# Patient Record
Sex: FEMALE | Race: WHITE | NOT HISPANIC OR LATINO | Employment: STUDENT | URBAN - METROPOLITAN AREA
[De-identification: names, ages, dates, MRNs, and addresses within clinical notes are randomized per-mention and may not be internally consistent; named-entity substitution may affect disease eponyms.]

---
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
## 2023-08-21 ENCOUNTER — OFFICE VISIT (OUTPATIENT)
Dept: OTOLARYNGOLOGY | Facility: CLINIC | Age: 23
End: 2023-08-21
Payer: COMMERCIAL

## 2023-08-21 VITALS — WEIGHT: 276.69 LBS | BODY MASS INDEX: 43.43 KG/M2 | HEIGHT: 67 IN

## 2023-08-21 DIAGNOSIS — H61.21 IMPACTED CERUMEN OF RIGHT EAR: ICD-10-CM

## 2023-08-21 DIAGNOSIS — R51.9 CHRONIC INTRACTABLE HEADACHE, UNSPECIFIED HEADACHE TYPE: Primary | ICD-10-CM

## 2023-08-21 DIAGNOSIS — G89.29 CHRONIC INTRACTABLE HEADACHE, UNSPECIFIED HEADACHE TYPE: Primary | ICD-10-CM

## 2023-08-21 DIAGNOSIS — H92.01 OTALGIA, RIGHT: ICD-10-CM

## 2023-08-21 PROCEDURE — 99204 PR OFFICE/OUTPT VISIT, NEW, LEVL IV, 45-59 MIN: ICD-10-PCS | Mod: 25,S$GLB,, | Performed by: OTOLARYNGOLOGY

## 2023-08-21 PROCEDURE — 1160F RVW MEDS BY RX/DR IN RCRD: CPT | Mod: CPTII,S$GLB,, | Performed by: OTOLARYNGOLOGY

## 2023-08-21 PROCEDURE — 69210 PR REMOVAL IMPACTED CERUMEN REQUIRING INSTRUMENTATION, UNILATERAL: ICD-10-PCS | Mod: S$GLB,,, | Performed by: OTOLARYNGOLOGY

## 2023-08-21 PROCEDURE — 99999 PR PBB SHADOW E&M-NEW PATIENT-LVL III: CPT | Mod: PBBFAC,,, | Performed by: OTOLARYNGOLOGY

## 2023-08-21 PROCEDURE — 99999 PR PBB SHADOW E&M-NEW PATIENT-LVL III: ICD-10-PCS | Mod: PBBFAC,,, | Performed by: OTOLARYNGOLOGY

## 2023-08-21 PROCEDURE — 1160F PR REVIEW ALL MEDS BY PRESCRIBER/CLIN PHARMACIST DOCUMENTED: ICD-10-PCS | Mod: CPTII,S$GLB,, | Performed by: OTOLARYNGOLOGY

## 2023-08-21 PROCEDURE — 1159F PR MEDICATION LIST DOCUMENTED IN MEDICAL RECORD: ICD-10-PCS | Mod: CPTII,S$GLB,, | Performed by: OTOLARYNGOLOGY

## 2023-08-21 PROCEDURE — 99204 OFFICE O/P NEW MOD 45 MIN: CPT | Mod: 25,S$GLB,, | Performed by: OTOLARYNGOLOGY

## 2023-08-21 PROCEDURE — 69210 REMOVE IMPACTED EAR WAX UNI: CPT | Mod: S$GLB,,, | Performed by: OTOLARYNGOLOGY

## 2023-08-21 PROCEDURE — 3008F BODY MASS INDEX DOCD: CPT | Mod: CPTII,S$GLB,, | Performed by: OTOLARYNGOLOGY

## 2023-08-21 PROCEDURE — 1159F MED LIST DOCD IN RCRD: CPT | Mod: CPTII,S$GLB,, | Performed by: OTOLARYNGOLOGY

## 2023-08-21 PROCEDURE — 3008F PR BODY MASS INDEX (BMI) DOCUMENTED: ICD-10-PCS | Mod: CPTII,S$GLB,, | Performed by: OTOLARYNGOLOGY

## 2023-08-21 RX ORDER — ONDANSETRON 4 MG/1
4 TABLET, ORALLY DISINTEGRATING ORAL EVERY 8 HOURS PRN
COMMUNITY
Start: 2023-08-16 | End: 2023-10-26

## 2023-08-21 RX ORDER — PREDNISONE 20 MG/1
40 TABLET ORAL DAILY
Qty: 10 TABLET | Refills: 0 | Status: SHIPPED | OUTPATIENT
Start: 2023-08-21 | End: 2023-08-26

## 2023-08-21 NOTE — PATIENT INSTRUCTIONS
Oral Steroid    You have been prescribed an oral steroid. Use as directed.    Note: This medication can be very effective in treating inflammation but may be associated with several side effects such as:    Stomach irritation (consider antacid medication while you are on prednisone),  insomnia (please take in the morning to reduce this if dosing is once daily), mood changes, high blood pressure, elevated sugar levels (especially in diabetics), infections, fluid retention, rash, swelling, tendon rupture, and hip fracture.     Please report any liver disease, diabetes, osteoporosis, stomach ulcer disease, glaucoma, history of GI bleeding, Myasthenia Gravis PRIOR to initiating this medication.

## 2023-08-21 NOTE — PROGRESS NOTES
Subjective:       Patient ID: Malinda Brady is a 23 y.o. female.    Chief Complaint: Otitis Media (Pt has been having headaches, nausea, vomit and thinks it is from an ear infection )    Malinda is here for right otalgia.   Length of symptoms: 1 week.  Onset: Acute  Symptoms occur Every day  She endorses severe otalgia, aching in nature. Worse with laying down. Has radiating neck pain and a frontotemporal headache. She has light sensitivity.   Went to  recently. Therapies tried include: Ibuprofen , Tylenol, Zofran       Patient validated questionnaires (if applicable):      %            No data to display                   No data to display                   No data to display                     Social History     Tobacco Use   Smoking Status Never   Smokeless Tobacco Never     Social History     Substance and Sexual Activity   Alcohol Use No          Objective:        Constitutional:   Vital signs are normal. She appears well-developed and well-nourished.     Head:  Normocephalic and atraumatic.     Ears:  Left ear hearing normal to normal and whispered voice; external ear normal without scars, lesions, or masses; ear canal, tympanic membrane, and middle ear normal..   R cerumen impaction    Nose:  Nose normal including turbinates, nasal mucosa, sinuses and nasal septum.     Mouth/Throat  Oropharynx clear and moist without lesions or asymmetry.     Neck:  Neck normal without thyromegaly masses, asymmetry, normal tracheal structure, crepitus, and tenderness.         Tests / Results:  Procedure: bilateral microscopy with removal of cerumen  Reason: cerumen impaction, inability to visualize the tympanic membrane  Details: microscope used to obtain view of ear canals bilaterally cerumen removed with the use curette, suction, and alligator forceps  Findings: AD: thick, dry dense impaction against TM - TM normal after cleaning, AS: no wax  Patient tolerated procedure well.    Assessment:       1. Chronic  intractable headache, unspecified headache type    2. Impacted cerumen of right ear    3. Otalgia, right          Plan:         Otalgia may be 2/2 ceurmen impaction which was removed.  Headache unlikely related - intractable based on length. Short course of steroids discussed.   Recommend ER if HA not improved or other symptoms develop    I discussed the risks of corticosteroid use directly with the patient. This includes increased appetite with weight change, increased blood pressure, decreased sleep, upset stomach (possible ulcers), pancreatitis, glucose/metabolism changes, mood changes/irritability, immunosuppression, acute glaucoma, osteoporosis, and rarely necrosis of femur. Patient understood and would like to proceed with treatment.

## 2023-08-23 ENCOUNTER — TELEPHONE (OUTPATIENT)
Dept: NEUROLOGY | Facility: CLINIC | Age: 23
End: 2023-08-23
Payer: COMMERCIAL

## 2023-08-23 NOTE — TELEPHONE ENCOUNTER
----- Message from Thania Aguilar RN sent at 8/22/2023  2:46 PM CDT -----  Regarding: [facility] ER visit f/u 8/22/2023 Dx: Headache  Please call patient to schedule an appointment for further evaluation and treatment of her headache.       Thanks,    Thania Aguilar RN, BSN  ED Navigator/Case Management  [phone]

## 2023-08-24 ENCOUNTER — TELEPHONE (OUTPATIENT)
Dept: NEUROLOGY | Facility: CLINIC | Age: 23
End: 2023-08-24
Payer: COMMERCIAL

## 2023-08-24 ENCOUNTER — OFFICE VISIT (OUTPATIENT)
Dept: NEUROLOGY | Facility: CLINIC | Age: 23
End: 2023-08-24
Payer: COMMERCIAL

## 2023-08-24 VITALS
WEIGHT: 276.88 LBS | HEART RATE: 71 BPM | RESPIRATION RATE: 17 BRPM | BODY MASS INDEX: 43.46 KG/M2 | HEIGHT: 67 IN | DIASTOLIC BLOOD PRESSURE: 91 MMHG | SYSTOLIC BLOOD PRESSURE: 135 MMHG | TEMPERATURE: 97 F

## 2023-08-24 DIAGNOSIS — G89.29 CHRONIC NONINTRACTABLE HEADACHE, UNSPECIFIED HEADACHE TYPE: ICD-10-CM

## 2023-08-24 DIAGNOSIS — R06.83 SNORING: ICD-10-CM

## 2023-08-24 DIAGNOSIS — R51.9 CHRONIC NONINTRACTABLE HEADACHE, UNSPECIFIED HEADACHE TYPE: ICD-10-CM

## 2023-08-24 DIAGNOSIS — H93.A9 PULSATILE TINNITUS, UNSPECIFIED EAR: ICD-10-CM

## 2023-08-24 DIAGNOSIS — H93.A9 PULSATILE TINNITUS: ICD-10-CM

## 2023-08-24 DIAGNOSIS — G43.009 MIGRAINE WITHOUT AURA AND WITHOUT STATUS MIGRAINOSUS, NOT INTRACTABLE: Primary | ICD-10-CM

## 2023-08-24 DIAGNOSIS — E66.01 MORBID OBESITY WITH BMI OF 40.0-44.9, ADULT: ICD-10-CM

## 2023-08-24 DIAGNOSIS — M79.18 CERVICAL MYOFASCIAL PAIN SYNDROME: ICD-10-CM

## 2023-08-24 DIAGNOSIS — R51.9 WORSENING HEADACHES: ICD-10-CM

## 2023-08-24 DIAGNOSIS — H53.9 VISION CHANGES: ICD-10-CM

## 2023-08-24 DIAGNOSIS — R51.9 NONINTRACTABLE HEADACHE, UNSPECIFIED CHRONICITY PATTERN, UNSPECIFIED HEADACHE TYPE: ICD-10-CM

## 2023-08-24 DIAGNOSIS — R11.0 NAUSEA: ICD-10-CM

## 2023-08-24 PROCEDURE — 1160F RVW MEDS BY RX/DR IN RCRD: CPT | Mod: CPTII,S$GLB,, | Performed by: NURSE PRACTITIONER

## 2023-08-24 PROCEDURE — 99999 PR PBB SHADOW E&M-EST. PATIENT-LVL V: ICD-10-PCS | Mod: PBBFAC,,, | Performed by: NURSE PRACTITIONER

## 2023-08-24 PROCEDURE — 3080F PR MOST RECENT DIASTOLIC BLOOD PRESSURE >= 90 MM HG: ICD-10-PCS | Mod: CPTII,S$GLB,, | Performed by: NURSE PRACTITIONER

## 2023-08-24 PROCEDURE — 3080F DIAST BP >= 90 MM HG: CPT | Mod: CPTII,S$GLB,, | Performed by: NURSE PRACTITIONER

## 2023-08-24 PROCEDURE — 3008F PR BODY MASS INDEX (BMI) DOCUMENTED: ICD-10-PCS | Mod: CPTII,S$GLB,, | Performed by: NURSE PRACTITIONER

## 2023-08-24 PROCEDURE — 96372 THER/PROPH/DIAG INJ SC/IM: CPT | Mod: S$GLB,,, | Performed by: NURSE PRACTITIONER

## 2023-08-24 PROCEDURE — 3075F SYST BP GE 130 - 139MM HG: CPT | Mod: CPTII,S$GLB,, | Performed by: NURSE PRACTITIONER

## 2023-08-24 PROCEDURE — 96372 PR INJECTION,THERAP/PROPH/DIAG2ST, IM OR SUBCUT: ICD-10-PCS | Mod: S$GLB,,, | Performed by: NURSE PRACTITIONER

## 2023-08-24 PROCEDURE — 3075F PR MOST RECENT SYSTOLIC BLOOD PRESS GE 130-139MM HG: ICD-10-PCS | Mod: CPTII,S$GLB,, | Performed by: NURSE PRACTITIONER

## 2023-08-24 PROCEDURE — 1159F PR MEDICATION LIST DOCUMENTED IN MEDICAL RECORD: ICD-10-PCS | Mod: CPTII,S$GLB,, | Performed by: NURSE PRACTITIONER

## 2023-08-24 PROCEDURE — 99205 PR OFFICE/OUTPT VISIT, NEW, LEVL V, 60-74 MIN: ICD-10-PCS | Mod: 25,S$GLB,, | Performed by: NURSE PRACTITIONER

## 2023-08-24 PROCEDURE — 1159F MED LIST DOCD IN RCRD: CPT | Mod: CPTII,S$GLB,, | Performed by: NURSE PRACTITIONER

## 2023-08-24 PROCEDURE — 3008F BODY MASS INDEX DOCD: CPT | Mod: CPTII,S$GLB,, | Performed by: NURSE PRACTITIONER

## 2023-08-24 PROCEDURE — 99999 PR PBB SHADOW E&M-EST. PATIENT-LVL V: CPT | Mod: PBBFAC,,, | Performed by: NURSE PRACTITIONER

## 2023-08-24 PROCEDURE — 99205 OFFICE O/P NEW HI 60 MIN: CPT | Mod: 25,S$GLB,, | Performed by: NURSE PRACTITIONER

## 2023-08-24 PROCEDURE — 1160F PR REVIEW ALL MEDS BY PRESCRIBER/CLIN PHARMACIST DOCUMENTED: ICD-10-PCS | Mod: CPTII,S$GLB,, | Performed by: NURSE PRACTITIONER

## 2023-08-24 RX ORDER — PROCHLORPERAZINE MALEATE 10 MG
10 TABLET ORAL EVERY 6 HOURS PRN
Qty: 60 TABLET | Refills: 11 | Status: SHIPPED | OUTPATIENT
Start: 2023-08-24 | End: 2023-10-26

## 2023-08-24 RX ORDER — KETOROLAC TROMETHAMINE 30 MG/ML
30 INJECTION, SOLUTION INTRAMUSCULAR; INTRAVENOUS
Status: COMPLETED | OUTPATIENT
Start: 2023-08-24 | End: 2023-08-24

## 2023-08-24 RX ORDER — TIZANIDINE 4 MG/1
TABLET ORAL
Qty: 30 TABLET | Refills: 11 | Status: SHIPPED | OUTPATIENT
Start: 2023-08-24

## 2023-08-24 RX ORDER — FROVATRIPTAN SUCCINATE 2.5 MG/1
TABLET, FILM COATED ORAL
Qty: 10 TABLET | Refills: 11 | Status: SHIPPED | OUTPATIENT
Start: 2023-08-24 | End: 2023-09-22 | Stop reason: ALTCHOICE

## 2023-08-24 RX ADMIN — KETOROLAC TROMETHAMINE 30 MG: 30 INJECTION, SOLUTION INTRAMUSCULAR; INTRAVENOUS at 01:08

## 2023-08-24 NOTE — PROGRESS NOTES
Date of service: 8/24/2023  Referring provider: Vidya Coronel    Subjective:      Chief complaint: Headache       Patient ID: Malinda Brady is a 23 y.o. female who presents for new patient evaluation of headache     History of Present Illness    ORIGINAL HEADACHE HISTORY - 8/24/23  Age at onset and course over time: 8/14/23 began with migraine that has not broken. Went to ER. Currently on steroids (started yesterday).  Began with migraine headaches around age 16 with menstrual cycle. Treated with OTC.    Family  history of migraines - sister, mom  Family history of aneurysm - none  Last eye exam - over 10 years ago    No recent high dose vitamin A or tetracycline use. Steady weight, no changes for some time. She has had some vision changes in the past month. She has poor vision more so when tired. Left eye blurry.     Location: varies   Quality:  [x] pressure [] tight [x] throbbing [] sharp [] stabbing   Severity: current 6 with range 3-8  Duration: days  Frequency: daily since 8/14  Headaches awaken at night?:  yes  Worst time of day: upon waking   Associated with: [x] photophobia [x]  phonophobia [] osmophobia [] blurred vision  [] double vision [x] loss of appetite [x] nausea [x] vomiting [x] dizziness [] vertigo  [x] tinnitus [] irritability [] sinus pressure [] problems with concentration   [x] neck tightness   Alleviated by:  [x] sleep [x] darkness [] massage [] heat [x] ice [x] medication  Exacerbated by:  [x] fatigue [x] light [x] noise [] smells [] coughing [] sneezing  [x] bending over [x] ovulation [] menses [] alcohol [] change in weather [x]  stress  Ipsilateral autonomic: [] nasal congestion [] lacrimation [] ptosis [] injection [] edema [] foreign body sensation [] ear fullness   ICP:  [] transient visual obscurations  [x] tinnitus pulsatile  [] positional headache  [x] non-positional   Sleep habits: trouble staying asleep, unrefreshing sleep, snoring   Caffeine intake: 16 oz  Gyn status (if  female): having periods, uses condoms    HIT 6: 64    Current acute treatment:  Zofran  Ibuprofen - 5 days per week    Current prevention:  None    Previously tried/failed acute treatment:  Ibuprofen  Tylenol  Excedrin    Previously tried/failed preventative treatment:  None     Review of patient's allergies indicates:  No Known Allergies  Current Outpatient Medications   Medication Sig Dispense Refill    ondansetron (ZOFRAN-ODT) 4 MG TbDL Take 4 mg by mouth every 8 (eight) hours as needed.      predniSONE (DELTASONE) 20 MG tablet Take 2 tablets (40 mg total) by mouth once daily. Take in am, take with food for 5 days 10 tablet 0    frovatriptan (FROVA) 2.5 MG tablet 1 tab PO PRN migraine. May repeat once in 2 hours, no more than 2 tab in 24 hours. Use no more than 10 days per month. 10 tablet 11    prochlorperazine (COMPAZINE) 10 MG tablet Take 1 tablet (10 mg total) by mouth every 6 (six) hours as needed (migraine or nausea). 60 tablet 11    tiZANidine (ZANAFLEX) 4 MG tablet Half or full tablet by mouth at night as needed for muscle spasm 30 tablet 11     No current facility-administered medications for this visit.       Past Medical History  Past Medical History:   Diagnosis Date    Acne     Menarche     Age of onset 10       Past Surgical History  Past Surgical History:   Procedure Laterality Date    TONSILLECTOMY      Tonsils         Family History  Family History   Problem Relation Age of Onset    Acne Brother     Breast cancer Paternal Grandmother     Migraines Father     Arthritis Mother     Migraines Sister     No Known Problems Sister     Colon cancer Neg Hx     Diabetes Neg Hx     Hypertension Neg Hx     Ovarian cancer Neg Hx     Melanoma Neg Hx     Lupus Neg Hx     Psoriasis Neg Hx     Eczema Neg Hx     Cancer Neg Hx        Social History  Social History     Socioeconomic History    Marital status: Single   Occupational History    Occupation: Student   Tobacco Use    Smoking status: Never    Smokeless  tobacco: Never   Substance and Sexual Activity    Alcohol use: No    Drug use: No    Sexual activity: Yes     Birth control/protection: Condom   Other Topics Concern    Are you pregnant or think you may be? No    Breast-feeding No   Social History Narrative    Lives in a dorm room at school and comes home to both parents during her breaks    2 dog     3 siblings    1st year at [employer]        Review of Systems  14-point review of systems as follows:   No check arelis indicates NEGATIVE response   Constitutional: [x] weight loss, [x] change to appetite   Eyes: [x] change in vision, [] double vision   Ears, nose, mouth, throat: [] frequent nose bleeds, [x] ringing in the ears   Respiratory: [] cough, [] wheezing   Cardiovascular: [] chest pain, [] palpitations   Gastrointestinal: [] jaundice, [] nausea/vomiting   Genitourinary: [] incontinence, [] burning with urination   Hematologic/lymphatic: [] easy bruising/bleeding, [x] night sweats   Neurological: [] numbness, [x] weakness   Endocrine: [x] fatigue, [] heat/cold intolerance   Allergy/Immunologic: [] fevers, [] chills   Musculoskeletal: [] muscle pain, [] joint pain   Psychiatric: [] thoughts of harming self/others, [] depression   Integumentary: [] rashes, [] sores that do not heal     Objective:        Vitals:    08/24/23 1308   BP: (!) 135/91   Pulse: 71   Resp: 17   Temp: 97.2 °F (36.2 °C)     Body mass index is 43.37 kg/m².    8/24/23  Constitutional: appears in no acute distress, well-developed, well-nourished     Eyes: normal conjunctiva, PERRLA    Ears, nose, mouth, throat: external appearance of ears and nose normal, hearing intact     Cardiovascular: regular rate and rhythm, no murmurs appreciated    Respiratory: unlabored respirations, breath sounds normal bilaterally    Gastrointestinal: no visible abdominal masses, no guarding, no visible hernia    Musculoskeletal: normal tone in all four extremities. No abnormal movements. No pronator drift. No  orbit. Symmetric finger tapping. Normal station. Normal regular gait. Normal tandem gait.      Spine:   CERVICAL SPINE:  ROM: mildly restricted    MUSCLE SPASM: no   FACET LOADING: no   SPURLING: no  JORDAN / PAMELA tender: no     Psychiatric: normal judgment and insight. Oriented to person, place, and time.     Neurologic:   Cortical functions: recent and remote memory intact, normal attention span and concentration, speech fluent, adequate fund of knowledge   Cranial nerves: visual fields full, PERRLA, EOMI, symmetric facial strength, hearing intact, palate elevates symmetrically, shoulder shrug 5/5, tongue protrudes midline   Reflexes: 2+ in the upper and lower extremities, no Lyons  Sensation: intact to temperature throughout   Coordination: normal finger to nose, heel to shin    Data Review:     I have personally reviewed the referring provider's notes, labs, & imaging made available to me today.      RADIOLOGY STUDIES:  I have personally reviewed the pertinent images performed.       Results for orders placed or performed during the hospital encounter of 08/22/23   CT Head Without Contrast    Narrative    EXAMINATION:  CT HEAD WITHOUT CONTRAST    CLINICAL HISTORY:  Headache, chronic, new features or increased frequency;    TECHNIQUE:  Low dose axial images were obtained through the head.  Coronal and sagittal reformations were also performed. Contrast was not administered.    Automated exposure control radiation dose lowering technique was utilized.  The DLP is 549.    COMPARISON:  None.    FINDINGS:  Brain is normally formed.  There is no hydrocephalus or any abnormal extra-axial fluid collections.  No acute intracranial hemorrhages.  Within the supratentorial cerebral hemispheres the gray/white matter delineation is preserved.  No abnormal mass effects or midline shift or herniations.  There is no parenchymal hemorrhages.  In the posterior fossa the 4th ventricle is in midline.  No cerebellar hemorrhages or  "signs for cerebellar infarctions.    There is a borderline enlargement of the sella turcica with a partially empty sella.  This finding can sometimes be seen in patients with chronic intracranial hypertension.    Visualized ocular globes demonstrate no gross abnormalities.  Visualized paranasal air sinuses are clear.  Normal pneumatization of the mastoids.  Cranial vault fractures or osteoblastic or lytic lesions.      Impression    1. No acute intracranial processes.  2. Borderline enlargement of the sella turcica with a partially empty sella as above.      Electronically signed by: Nick Bee MD  Date:    08/22/2023  Time:    11:59       Lab Results   Component Value Date    AST 15 07/20/2015    ALT 22 07/20/2015    ALBUMIN 3.5 07/20/2015    PROT 7.1 07/20/2015    BILITOT 0.3 07/20/2015    CHOL 166 07/20/2015    HDL 30 (L) 07/20/2015    LDLCALC 99.8 07/20/2015    TRIG 181 (H) 07/20/2015       No results found for: "WBC", "HGB", "HCT", "MCV", "PLT"    No results found for: "TSH"        Assessment & Plan:       Problem List Items Addressed This Visit          Neuro    Migraine without aura and without status migrainosus, not intractable - Primary    Overview     Menstrual migraines since around age 16. Headaches are typically unilateral, moderate to severe in intensity, worsen with activity, pounding in quality and associated with sensitivity to light and sound.     Given sudden worsening with vision changes, recommend imaging. Need to evaluate for pseudotumor cerebri.    Her typical frequency is once per month. Start magnesium. For acute attacks will start with frovatriptan. Its long half life is ideal for menstrual migraines.          Relevant Medications    ketorolac injection 30 mg (Completed)    frovatriptan (FROVA) 2.5 MG tablet       Endocrine    Morbid obesity with BMI of 40.0-44.9, adult    Current Assessment & Plan     Discussed 8% weight loss can start to improve symptoms and 20% curative for PCT.       "    Relevant Orders    Ambulatory referral/consult to Sleep Disorders     Other Visit Diagnoses       Nonintractable headache, unspecified chronicity pattern, unspecified headache type        Chronic nonintractable headache, unspecified headache type        Relevant Orders    MRV Brain Without Contrast    Pulsatile tinnitus, unspecified ear        Relevant Orders    MRV Brain Without Contrast    Cervical myofascial pain syndrome        Relevant Medications    tiZANidine (ZANAFLEX) 4 MG tablet    Worsening headaches        Relevant Orders    MRI Brain W WO Contrast    MRV Brain Without Contrast    Pulsatile tinnitus        Relevant Orders    MRV Brain Without Contrast    Vision changes        Relevant Orders    Ambulatory referral/consult to Ophthalmology    Snoring        Relevant Orders    Ambulatory referral/consult to Sleep Disorders    Nausea        Relevant Medications    prochlorperazine (COMPAZINE) 10 MG tablet                Please call our clinic at [phone] or send a message on the [employer] portal if there are any changes to the plan described below, for example,if you are not contacted for the requested tests, referral(s) within one week, if you are unable to receive the medications prescribed, or if you feel you need to change the treatment course for any reason.     TESTING:  -- MRI, MRV  -- sleep study    REFERRALS:  -- neuro-ophthalmology  -- sleep specialist    PREVENTION (use daily regardless of headache):  -- start magnesium in ONE of the following preparations -               1. Magnesium oxide 800mg daily (the most common over the counter kind, may causes loose stools)              2. Magnesium citrate 400-500mg daily (harder to find, but more neutral on the bowels)              3. Magnesium glycinate 400mg daily (hardest to find, look online, but most bowel-neutral, best absorbed)   -- if you have papilledema, we will start Diamox    AS-NEEDED TREATMENT (use total no more than 10 days per  month unless otherwise stated):  -- START frovatriptan. You can repeat two hours later if needed  -- START tizanidine at night. This is a muscle relaxer and it will also make you potentially sleepy. Start with half a tablet to see how you respond but can take up to a whole tablet if needed  -- start compazine. This is a nausea medication that also has anti-migraine properties. Can take daily and will not contribute to rebound headaches      Follow up in about 6 weeks (around 10/5/2023).    Kika Najera, NP

## 2023-08-24 NOTE — PATIENT INSTRUCTIONS
Please call our clinic at [phone] or send a message on the [employer] portal if there are any changes to the plan described below, for example,if you are not contacted for the requested tests, referral(s) within one week, if you are unable to receive the medications prescribed, or if you feel you need to change the treatment course for any reason.     TESTING:  -- MRI, MRV  -- sleep study    REFERRALS:  -- neuro-ophthalmology  -- sleep specialist    PREVENTION (use daily regardless of headache):  -- start magnesium in ONE of the following preparations -               1. Magnesium oxide 800mg daily (the most common over the counter kind, may causes loose stools)              2. Magnesium citrate 400-500mg daily (harder to find, but more neutral on the bowels)              3. Magnesium glycinate 400mg daily (hardest to find, look online, but most bowel-neutral, best absorbed)   -- if you have papilledema, we will start Diamox    AS-NEEDED TREATMENT (use total no more than 10 days per month unless otherwise stated):  -- START frovatriptan. You can repeat two hours later if needed  -- START tizanidine at night. This is a muscle relaxer and it will also make you potentially sleepy. Start with half a tablet to see how you respond but can take up to a whole tablet if needed  -- start compazine. This is a nausea medication that also has anti-migraine properties. Can take daily and will not contribute to rebound headaches

## 2023-08-28 ENCOUNTER — PATIENT MESSAGE (OUTPATIENT)
Dept: NEUROLOGY | Facility: CLINIC | Age: 23
End: 2023-08-28
Payer: COMMERCIAL

## 2023-09-06 ENCOUNTER — DOCUMENTATION ONLY (OUTPATIENT)
Dept: NEUROLOGY | Facility: CLINIC | Age: 23
End: 2023-09-06
Payer: COMMERCIAL

## 2023-09-06 ENCOUNTER — TELEPHONE (OUTPATIENT)
Dept: NEUROLOGY | Facility: CLINIC | Age: 23
End: 2023-09-06
Payer: COMMERCIAL

## 2023-09-06 DIAGNOSIS — G93.2 IIH (IDIOPATHIC INTRACRANIAL HYPERTENSION): Primary | ICD-10-CM

## 2023-09-06 NOTE — TELEPHONE ENCOUNTER
----- Message from Elysia Peres sent at 9/6/2023  2:33 PM CDT -----  Dropped off a disk for imaging

## 2023-09-06 NOTE — PROGRESS NOTES
"Received imaging report from [facility]  MRI with and without contrast: "there may be some flattening of the pituitary within the sella, suggestive of possibility of increased intracranial pressure"    MRA: "suspect bilateral transverse sinus stenoses, left greater than right"    "

## 2023-09-11 ENCOUNTER — TELEPHONE (OUTPATIENT)
Dept: OPHTHALMOLOGY | Facility: CLINIC | Age: 23
End: 2023-09-11
Payer: COMMERCIAL

## 2023-09-11 NOTE — TELEPHONE ENCOUNTER
Spoke to pt's mother in regards to the referral and informed her that it would be best to start off with an optometrist to triage her daughter's condition. Will forward pt's information to optom.      ----- Message from Yohana Cheney sent at 9/11/2023 11:27 AM CDT -----  Regarding: FW: advice  Contact: CHULA AGGIE S [[id number]]    ----- Message -----  From: Eagle Prasad  Sent: 9/11/2023  11:25 AM CDT  To: Romeo SANCHE[facility] Staff  Subject: advice                                           Type:  Sooner Appointment Request    Caller is requesting a sooner appointment.      Name of Caller:  kinza Duff    When is the first available appointment?  Dept book    Symptoms:  Referral in system     Best Call Back Number:  [phone]    Additional Information: Mom has been told it's possible Papilledema or pseudotumor by ED but they weren't sure, wants to start with Dr. Walters and get a definitive DX. Pt has had MRI with [facility] in [place].  Pt has limited visual acuity like tunnel vision. Please call to advise.

## 2023-09-12 ENCOUNTER — TELEPHONE (OUTPATIENT)
Dept: NEUROLOGY | Facility: CLINIC | Age: 23
End: 2023-09-12
Payer: COMMERCIAL

## 2023-09-12 ENCOUNTER — TELEPHONE (OUTPATIENT)
Dept: OPTOMETRY | Facility: CLINIC | Age: 23
End: 2023-09-12
Payer: COMMERCIAL

## 2023-09-12 ENCOUNTER — TELEPHONE (OUTPATIENT)
Dept: OPHTHALMOLOGY | Facility: CLINIC | Age: 23
End: 2023-09-12
Payer: COMMERCIAL

## 2023-09-12 ENCOUNTER — OFFICE VISIT (OUTPATIENT)
Dept: FAMILY MEDICINE | Facility: CLINIC | Age: 23
End: 2023-09-12
Payer: COMMERCIAL

## 2023-09-12 VITALS
OXYGEN SATURATION: 99 % | SYSTOLIC BLOOD PRESSURE: 142 MMHG | WEIGHT: 293 LBS | HEART RATE: 63 BPM | BODY MASS INDEX: 45.99 KG/M2 | HEIGHT: 67 IN | DIASTOLIC BLOOD PRESSURE: 98 MMHG

## 2023-09-12 DIAGNOSIS — H53.9 VISUAL DISTURBANCE: Primary | ICD-10-CM

## 2023-09-12 DIAGNOSIS — G43.009 MIGRAINE WITHOUT AURA AND WITHOUT STATUS MIGRAINOSUS, NOT INTRACTABLE: Chronic | ICD-10-CM

## 2023-09-12 DIAGNOSIS — E66.01 MORBID OBESITY WITH BMI OF 40.0-44.9, ADULT: Chronic | ICD-10-CM

## 2023-09-12 DIAGNOSIS — R03.0 ELEVATED BLOOD PRESSURE READING IN OFFICE WITHOUT DIAGNOSIS OF HYPERTENSION: ICD-10-CM

## 2023-09-12 PROCEDURE — 99204 OFFICE O/P NEW MOD 45 MIN: CPT | Mod: S$GLB,,, | Performed by: FAMILY MEDICINE

## 2023-09-12 PROCEDURE — 99999 PR PBB SHADOW E&M-EST. PATIENT-LVL III: CPT | Mod: PBBFAC,,, | Performed by: FAMILY MEDICINE

## 2023-09-12 PROCEDURE — 1159F PR MEDICATION LIST DOCUMENTED IN MEDICAL RECORD: ICD-10-PCS | Mod: CPTII,S$GLB,, | Performed by: FAMILY MEDICINE

## 2023-09-12 PROCEDURE — 3080F PR MOST RECENT DIASTOLIC BLOOD PRESSURE >= 90 MM HG: ICD-10-PCS | Mod: CPTII,S$GLB,, | Performed by: FAMILY MEDICINE

## 2023-09-12 PROCEDURE — 3077F SYST BP >= 140 MM HG: CPT | Mod: CPTII,S$GLB,, | Performed by: FAMILY MEDICINE

## 2023-09-12 PROCEDURE — 1160F RVW MEDS BY RX/DR IN RCRD: CPT | Mod: CPTII,S$GLB,, | Performed by: FAMILY MEDICINE

## 2023-09-12 PROCEDURE — 3077F PR MOST RECENT SYSTOLIC BLOOD PRESSURE >= 140 MM HG: ICD-10-PCS | Mod: CPTII,S$GLB,, | Performed by: FAMILY MEDICINE

## 2023-09-12 PROCEDURE — 3008F PR BODY MASS INDEX (BMI) DOCUMENTED: ICD-10-PCS | Mod: CPTII,S$GLB,, | Performed by: FAMILY MEDICINE

## 2023-09-12 PROCEDURE — 1159F MED LIST DOCD IN RCRD: CPT | Mod: CPTII,S$GLB,, | Performed by: FAMILY MEDICINE

## 2023-09-12 PROCEDURE — 99999 PR PBB SHADOW E&M-EST. PATIENT-LVL III: ICD-10-PCS | Mod: PBBFAC,,, | Performed by: FAMILY MEDICINE

## 2023-09-12 PROCEDURE — 3008F BODY MASS INDEX DOCD: CPT | Mod: CPTII,S$GLB,, | Performed by: FAMILY MEDICINE

## 2023-09-12 PROCEDURE — 3080F DIAST BP >= 90 MM HG: CPT | Mod: CPTII,S$GLB,, | Performed by: FAMILY MEDICINE

## 2023-09-12 PROCEDURE — 1160F PR REVIEW ALL MEDS BY PRESCRIBER/CLIN PHARMACIST DOCUMENTED: ICD-10-PCS | Mod: CPTII,S$GLB,, | Performed by: FAMILY MEDICINE

## 2023-09-12 PROCEDURE — 99204 PR OFFICE/OUTPT VISIT, NEW, LEVL IV, 45-59 MIN: ICD-10-PCS | Mod: S$GLB,,, | Performed by: FAMILY MEDICINE

## 2023-09-12 NOTE — PROGRESS NOTES
"PATIENT VISIT FAMILY MEDICINE    CC:   Chief Complaint   Patient presents with    Follow-up     Paperwork        HPI: Malinda Brady  is a 23 y.o. female:    Patient is new to me.  Patient presents alone for routine follow up on chronic conditions.    She lives in [place]. Has been having difficulty getting appts. Hx of chronic migraines. Suddenly worsened in 8/2023. She went to ED 8/22, CT head did not show acute findings. She saw Neurology 8/24, had MRI completed at [facility], results pending.     Reports her peripheral vision is "gone" and distant vision is significantly blurry. This symptoms are new since last month.    No history of hypertension. Does not have a home BP cuff.     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 She is unable to drive given her visual symptoms and finds she is unable to complete computer work due to this as well.     Her last day of work was 8/14. She has not been able to return due to the above.         PMHX:   Past Medical History:   Diagnosis Date    Acne     Menarche     Age of onset 10       PSHX:   Past Surgical History:   Procedure Laterality Date    TONSILLECTOMY      Tonsils         FAMHX:   Family History   Problem Relation Age of Onset    Acne Brother     Breast cancer Paternal Grandmother     Migraines Father     Arthritis Mother     Migraines Sister     No Known Problems Sister     Colon cancer Neg Hx     Diabetes Neg Hx     Hypertension Neg Hx     Ovarian cancer Neg Hx     Melanoma Neg Hx     Lupus Neg Hx     Psoriasis Neg Hx     Eczema Neg Hx     Cancer Neg Hx        SOCHX:   Social History     Socioeconomic History    Marital status: Single   Occupational History    Occupation: Student   Tobacco Use    Smoking status: Never    Smokeless tobacco: Never   Substance and Sexual Activity    Alcohol use: No    Drug use: No    Sexual activity: Yes     Birth control/protection: Condom   Other Topics Concern    Are you pregnant or think you may be? No    Breast-feeding No " "  Social History Narrative    Lives in a dorm room at school and comes home to both parents during her breaks    2 dog     3 siblings    1st year at [employer]       ALLERGIES: Review of patient's allergies indicates:  No Known Allergies    MEDS:   Current Outpatient Medications:     frovatriptan (FROVA) 2.5 MG tablet, 1 tab PO PRN migraine. May repeat once in 2 hours, no more than 2 tab in 24 hours. Use no more than 10 days per month., Disp: 10 tablet, Rfl: 11    ondansetron (ZOFRAN-ODT) 4 MG TbDL, Take 4 mg by mouth every 8 (eight) hours as needed., Disp: , Rfl:     prochlorperazine (COMPAZINE) 10 MG tablet, Take 1 tablet (10 mg total) by mouth every 6 (six) hours as needed (migraine or nausea)., Disp: 60 tablet, Rfl: 11    tiZANidine (ZANAFLEX) 4 MG tablet, Half or full tablet by mouth at night as needed for muscle spasm, Disp: 30 tablet, Rfl: 11    OBJECTIVE:   Vitals:    09/12/23 1508   BP: (!) 142/98   BP Location: Left arm   Patient Position: Sitting   BP Method: Large (Manual)   Pulse: 63   SpO2: 99%   Weight: (!) 136.4 kg (300 lb 11.3 oz)   Height: 5' 7" (1.702 m)     Body mass index is 47.1 kg/m².      Physical Exam  Vitals and nursing note reviewed.   HENT:      Head: Normocephalic.   Eyes:      General:         Right eye: No discharge.         Left eye: No discharge.      Extraocular Movements: Extraocular movements intact.      Conjunctiva/sclera: Conjunctivae normal.      Pupils: Pupils are equal, round, and reactive to light.   Cardiovascular:      Rate and Rhythm: Normal rate and regular rhythm.      Heart sounds: Normal heart sounds.   Pulmonary:      Effort: Pulmonary effort is normal.      Breath sounds: Normal breath sounds.   Musculoskeletal:      Cervical back: Neck supple.      Right lower leg: No edema.      Left lower leg: No edema.   Lymphadenopathy:      Cervical: No cervical adenopathy.   Skin:     Comments: No obvious rash on exposed skin   Neurological:      Mental Status: She is " alert.   Psychiatric:         Behavior: Behavior normal.     ASSESSMENT & PLAN:    Problem List Items Addressed This Visit          Neuro    Migraine without aura and without status migrainosus, not intractable (Chronic)    Overview     Followed by Neurology - has upcoming follow up this month     Menstrual migraines since around age 16. Headaches are typically unilateral, moderate to severe in intensity, worsen with activity, pounding in quality and associated with sensitivity to light and sound.     Given sudden worsening with vision changes, recommend imaging. Need to evaluate for pseudotumor cerebri.    Her typical frequency is once per month. Start magnesium. For acute attacks will start with frovatriptan. Its long half life is ideal for menstrual migraines.             Ophtho    Visual disturbance - Primary (Chronic) she is waiting to get in with Neuro Ophthalmology. Message sent to Ophthalmology in [place] to see if she can be seen sooner. Reports significant deficit in far sighted vision and peripheral vision.     [facility] paperwork filled out and will be sent with relevant treatment notes.        Cardiac/Vascular    Elevated blood pressure reading in office without diagnosis of hypertension (Chronic)    Overview     Recommend home BP log. Patient can message me in 1-2 weeks with home readings and we can consider started medication if needed. Also advised to establish with PCP in [place] in 1 month for continued care.             Endocrine    Morbid obesity with BMI of 40.0-44.9, adult (Chronic)    Overview     Stable. The patient is asked to make an attempt to improve diet and exercise patterns to aid in medical management of this problem.              Needs to establish with PCP where she lives in 1 month for BP f/u and labs.       RTC/ED precautions discussed where applicable.   Encouraged patient to let me know if there are any further questions/concerns.     Advise patient/caretaker to check with  insurance regarding orders to avoid unexpected fees/costs.     The patient/caretaker indicates understanding of these issues and agrees with the plan.    Dr. Lissett Watt MD  Family Medicine

## 2023-09-12 NOTE — TELEPHONE ENCOUNTER
----- Message from Ada Dietz MA sent at 9/11/2023  4:46 PM CDT -----  Regarding: FW: advice  Contact: CHULA AGGIE CEE [[id number]]  Can this pt see Dr. Barth or Dr. Ramirez?  ----- Message -----  From: Yohana Cheney  Sent: 9/11/2023  11:27 AM CDT  To: Ada Dietz MA; Juani Sprague  Subject: FW: advice                                         ----- Message -----  From: Eagle Prasad  Sent: 9/11/2023  11:25 AM CDT  To: Romeo Hayden  Subject: advice                                           Type:  Sooner Appointment Request    Caller is requesting a sooner appointment.      Name of Caller:  kinza Duff    When is the first available appointment?  Dept book    Symptoms:  Referral in system     Best Call Back Number:  [phone]    Additional Information: Mom has been told it's possible Papilledema or pseudotumor by ED but they weren't sure, wants to start with Dr. Walters and get a definitive DX. Pt has had MRI with [facility] in [place].  Pt has limited visual acuity like tunnel vision. Please call to advise.

## 2023-09-12 NOTE — TELEPHONE ENCOUNTER
Called patient's mom and she requested that referrals be sent to DR Noriega and DR Kelsey. Informed that would be completed for the patient. Routed referral to DR Kelsey's staff via [email or web address] and also faxed referral to DR Noriega at [phone].

## 2023-09-12 NOTE — TELEPHONE ENCOUNTER
----- Message from Allyson Glass sent at 9/12/2023 11:01 AM CDT -----  Regarding: Needs Medical Referral  Contact: patient's mom at [phone]  Type: Needs Medical Referral   Who Called:  patient's mom at [phone]    Additional Information: Calling to get an order for a referral for a neuroophthalmologist in [facility]. Please call and advise. Thank you

## 2023-09-12 NOTE — PATIENT INSTRUCTIONS
Keep a blood pressure log over the next 4 weeks and follow up in 4 weeks  -check it around the same time each day   -make sure you are resting for 5 minutes prior to checking   -be seated with your legs uncrossed   -I prefer an automatic upper arm cuff instead of a wrist cuff because it tends to be more accurate    Your goal blood pressure is less than 140/90

## 2023-09-12 NOTE — TELEPHONE ENCOUNTER
----- Message from Johanne Shah sent at 9/12/2023 10:54 AM CDT -----  Regarding: FW: advice  Contact: AGGIE QUIROS [[id number]]  Hey April, I had pt scheduled with Dr. Barth for tomorrow, but she needs fmla paperwork filled out for possible pseudotumor. Dr. Barth said that her neurologist, Dr. Najera wants her to see Neuro Ophthalmology. I talked to mom, and let her know I was sending message to Dr. Kelsey staff to get scheduled. Dr. Najera is also going to put in referral for Dr. Noriega(?) doctor outside of [facility]. Thanks!!  ----- Message -----  From: Ada Dietz MA  Sent: 9/11/2023   4:53 PM CDT  To: [facility] Clinical Support  Subject: FW: advice                                       Can this pt see Dr. Barth or Dr. Ramirez?  ----- Message -----  From: Yohana Cheney  Sent: 9/11/2023  11:27 AM CDT  To: Ada Dietz MA; Juani Sprague  Subject: FW: advice                                         ----- Message -----  From: Eagle Prasad  Sent: 9/11/2023  11:25 AM CDT  To: [facility] Staff  Subject: advice                                           Type:  Sooner Appointment Request    Caller is requesting a sooner appointment.      Name of Caller:  kinza Duff    When is the first available appointment?  Dept book    Symptoms:  Referral in system     Best Call Back Number:  [phone]    Additional Information: Mom has been told it's possible Papilledema or pseudotumor by ED but they weren't sure, wants to start with Dr. Walters and get a definitive DX. Pt has had MRI with [facility] in [place].  Pt has limited visual acuity like tunnel vision. Please call to advise.

## 2023-09-13 ENCOUNTER — PATIENT MESSAGE (OUTPATIENT)
Dept: FAMILY MEDICINE | Facility: CLINIC | Age: 23
End: 2023-09-13
Payer: COMMERCIAL

## 2023-09-22 ENCOUNTER — TELEPHONE (OUTPATIENT)
Dept: NEUROLOGY | Facility: CLINIC | Age: 23
End: 2023-09-22

## 2023-09-22 ENCOUNTER — LAB VISIT (OUTPATIENT)
Dept: LAB | Facility: HOSPITAL | Age: 23
End: 2023-09-22
Attending: NURSE PRACTITIONER
Payer: COMMERCIAL

## 2023-09-22 ENCOUNTER — OFFICE VISIT (OUTPATIENT)
Dept: NEUROLOGY | Facility: CLINIC | Age: 23
End: 2023-09-22
Payer: COMMERCIAL

## 2023-09-22 VITALS
SYSTOLIC BLOOD PRESSURE: 144 MMHG | TEMPERATURE: 98 F | BODY MASS INDEX: 43.49 KG/M2 | RESPIRATION RATE: 17 BRPM | HEIGHT: 67 IN | HEART RATE: 65 BPM | WEIGHT: 277.13 LBS | DIASTOLIC BLOOD PRESSURE: 91 MMHG

## 2023-09-22 DIAGNOSIS — G93.2 IIH (IDIOPATHIC INTRACRANIAL HYPERTENSION): ICD-10-CM

## 2023-09-22 DIAGNOSIS — G43.009 MIGRAINE WITHOUT AURA AND WITHOUT STATUS MIGRAINOSUS, NOT INTRACTABLE: Primary | ICD-10-CM

## 2023-09-22 DIAGNOSIS — R90.89 ABNORMAL MRA, BRAIN: ICD-10-CM

## 2023-09-22 DIAGNOSIS — H53.9 VISION CHANGES: ICD-10-CM

## 2023-09-22 DIAGNOSIS — H53.9 VISUAL DISTURBANCE: Chronic | ICD-10-CM

## 2023-09-22 DIAGNOSIS — Z13.29 THYROID DISORDER SCREEN: ICD-10-CM

## 2023-09-22 LAB
ALBUMIN SERPL BCP-MCNC: 4.3 G/DL (ref 3.5–5.2)
ALP SERPL-CCNC: 62 U/L (ref 55–135)
ALT SERPL W/O P-5'-P-CCNC: 41 U/L (ref 10–44)
ANION GAP SERPL CALC-SCNC: 7 MMOL/L (ref 8–16)
AST SERPL-CCNC: 20 U/L (ref 10–40)
BILIRUB SERPL-MCNC: 0.5 MG/DL (ref 0.1–1)
BUN SERPL-MCNC: 8 MG/DL (ref 6–20)
CALCIUM SERPL-MCNC: 9.4 MG/DL (ref 8.7–10.5)
CHLORIDE SERPL-SCNC: 105 MMOL/L (ref 95–110)
CO2 SERPL-SCNC: 26 MMOL/L (ref 23–29)
CREAT SERPL-MCNC: 1 MG/DL (ref 0.5–1.4)
EST. GFR  (NO RACE VARIABLE): >60 ML/MIN/1.73 M^2
ESTIMATED AVG GLUCOSE: 88 MG/DL (ref 68–131)
GLUCOSE SERPL-MCNC: 71 MG/DL (ref 70–110)
HBA1C MFR BLD: 4.7 % (ref 4–5.6)
POTASSIUM SERPL-SCNC: 4.3 MMOL/L (ref 3.5–5.1)
PROT SERPL-MCNC: 7.6 G/DL (ref 6–8.4)
SODIUM SERPL-SCNC: 138 MMOL/L (ref 136–145)
T4 FREE SERPL-MCNC: 0.75 NG/DL (ref 0.71–1.51)
TSH SERPL DL<=0.005 MIU/L-ACNC: 4.31 UIU/ML (ref 0.4–4)

## 2023-09-22 PROCEDURE — 99214 PR OFFICE/OUTPT VISIT, EST, LEVL IV, 30-39 MIN: ICD-10-PCS | Mod: S$GLB,,, | Performed by: NURSE PRACTITIONER

## 2023-09-22 PROCEDURE — 80053 COMPREHEN METABOLIC PANEL: CPT | Performed by: NURSE PRACTITIONER

## 2023-09-22 PROCEDURE — 3077F PR MOST RECENT SYSTOLIC BLOOD PRESSURE >= 140 MM HG: ICD-10-PCS | Mod: CPTII,S$GLB,, | Performed by: NURSE PRACTITIONER

## 2023-09-22 PROCEDURE — 3077F SYST BP >= 140 MM HG: CPT | Mod: CPTII,S$GLB,, | Performed by: NURSE PRACTITIONER

## 2023-09-22 PROCEDURE — 84443 ASSAY THYROID STIM HORMONE: CPT | Performed by: NURSE PRACTITIONER

## 2023-09-22 PROCEDURE — 99999 PR PBB SHADOW E&M-EST. PATIENT-LVL IV: ICD-10-PCS | Mod: PBBFAC,,, | Performed by: NURSE PRACTITIONER

## 2023-09-22 PROCEDURE — 3008F PR BODY MASS INDEX (BMI) DOCUMENTED: ICD-10-PCS | Mod: CPTII,S$GLB,, | Performed by: NURSE PRACTITIONER

## 2023-09-22 PROCEDURE — 1159F PR MEDICATION LIST DOCUMENTED IN MEDICAL RECORD: ICD-10-PCS | Mod: CPTII,S$GLB,, | Performed by: NURSE PRACTITIONER

## 2023-09-22 PROCEDURE — 99214 OFFICE O/P EST MOD 30 MIN: CPT | Mod: S$GLB,,, | Performed by: NURSE PRACTITIONER

## 2023-09-22 PROCEDURE — 1160F PR REVIEW ALL MEDS BY PRESCRIBER/CLIN PHARMACIST DOCUMENTED: ICD-10-PCS | Mod: CPTII,S$GLB,, | Performed by: NURSE PRACTITIONER

## 2023-09-22 PROCEDURE — 3080F PR MOST RECENT DIASTOLIC BLOOD PRESSURE >= 90 MM HG: ICD-10-PCS | Mod: CPTII,S$GLB,, | Performed by: NURSE PRACTITIONER

## 2023-09-22 PROCEDURE — 36415 COLL VENOUS BLD VENIPUNCTURE: CPT | Mod: PO | Performed by: NURSE PRACTITIONER

## 2023-09-22 PROCEDURE — 84439 ASSAY OF FREE THYROXINE: CPT | Performed by: NURSE PRACTITIONER

## 2023-09-22 PROCEDURE — 99999 PR PBB SHADOW E&M-EST. PATIENT-LVL IV: CPT | Mod: PBBFAC,,, | Performed by: NURSE PRACTITIONER

## 2023-09-22 PROCEDURE — 3008F BODY MASS INDEX DOCD: CPT | Mod: CPTII,S$GLB,, | Performed by: NURSE PRACTITIONER

## 2023-09-22 PROCEDURE — 83036 HEMOGLOBIN GLYCOSYLATED A1C: CPT | Performed by: NURSE PRACTITIONER

## 2023-09-22 PROCEDURE — 3080F DIAST BP >= 90 MM HG: CPT | Mod: CPTII,S$GLB,, | Performed by: NURSE PRACTITIONER

## 2023-09-22 PROCEDURE — 1160F RVW MEDS BY RX/DR IN RCRD: CPT | Mod: CPTII,S$GLB,, | Performed by: NURSE PRACTITIONER

## 2023-09-22 PROCEDURE — 1159F MED LIST DOCD IN RCRD: CPT | Mod: CPTII,S$GLB,, | Performed by: NURSE PRACTITIONER

## 2023-09-22 RX ORDER — TOPIRAMATE 50 MG/1
50 TABLET, FILM COATED ORAL 2 TIMES DAILY
Qty: 60 TABLET | Refills: 11 | Status: SHIPPED | OUTPATIENT
Start: 2023-09-22 | End: 2024-09-21

## 2023-09-22 RX ORDER — RIZATRIPTAN BENZOATE 10 MG/1
TABLET ORAL
Qty: 9 TABLET | Refills: 11 | Status: SHIPPED | OUTPATIENT
Start: 2023-09-22

## 2023-09-22 NOTE — TELEPHONE ENCOUNTER
Spoke with the pt, scheduled appt with vascular neurology at [facility] per request from Radha Najera NP. 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 Pt made aware of location. Pt v/u.

## 2023-09-22 NOTE — PROGRESS NOTES
Date of service: 9/22/2023  Referring provider: No ref. provider found    Subjective:      Chief complaint: Headache       Patient ID: Malinda Brady is a 23 y.o. female who presents for follow up of headache     History of Present Illness    INTERVAL HISTORY 9/22/23    Last visit was one month ago and at that time I referred to ophthalmology for evaluation of IIH and sleep study. We started frovatriptan. I ordered MRI and MRV which was done at [facility].    Today she reports she is better. Vision is slightly less blurry and headaches are less frequent. They occur in the back of the head. Current pain 2 with range 2-8. She has 5 headache days per week. She takes compazine near daily. Of note, she has stopped smoking which helped her vision some. But her peripheral vision is still poor. Frovatriptan makes her headaches worse. Otherwise information below is reviewed and verified with no changes made     ORIGINAL HEADACHE HISTORY - 8/24/23  Age at onset and course over time: 8/14/23 began with migraine that has not broken. Went to ER. Currently on steroids (started yesterday).  Began with migraine headaches around age 16 with menstrual cycle. Treated with OTC.    Family  history of migraines - sister, mom  Family history of aneurysm - none  Last eye exam - over 10 years ago    No recent high dose vitamin A or tetracycline use. Steady weight, no changes for some time. She has had some vision changes in the past month. She has poor vision more so when tired. Left eye blurry.     Location: varies   Quality:  [x] pressure [] tight [x] throbbing [] sharp [] stabbing   Severity: current 6 with range 3-8  Duration: days  Frequency: daily since 8/14  Headaches awaken at night?:  yes  Worst time of day: upon waking   Associated with: [x] photophobia [x]  phonophobia [] osmophobia [] blurred vision  [] double vision [x] loss of appetite [x] nausea [x] vomiting [x] dizziness [] vertigo  [x] tinnitus [] irritability [] sinus pressure  [] problems with concentration   [x] neck tightness   Alleviated by:  [x] sleep [x] darkness [] massage [] heat [x] ice [x] medication  Exacerbated by:  [x] fatigue [x] light [x] noise [] smells [] coughing [] sneezing  [x] bending over [x] ovulation [] menses [] alcohol [] change in weather [x]  stress  Ipsilateral autonomic: [] nasal congestion [] lacrimation [] ptosis [] injection [] edema [] foreign body sensation [] ear fullness   ICP:  [] transient visual obscurations  [x] tinnitus pulsatile  [] positional headache  [x] non-positional   Sleep habits: trouble staying asleep, unrefreshing sleep, snoring   Caffeine intake: 16 oz  Gyn status (if female): having periods, uses condoms    HIT 6: 64    Current acute treatment:  Zofran  Frovatriptan - worsens headaches   Compazine  Tizanidine    Current prevention:  Magnesium     Previously tried/failed acute treatment:  Ibuprofen  Tylenol  Excedrin    Previously tried/failed preventative treatment:  None     Review of patient's allergies indicates:  No Known Allergies  Current Outpatient Medications   Medication Sig Dispense Refill    prochlorperazine (COMPAZINE) 10 MG tablet Take 1 tablet (10 mg total) by mouth every 6 (six) hours as needed (migraine or nausea). 60 tablet 11    tiZANidine (ZANAFLEX) 4 MG tablet Half or full tablet by mouth at night as needed for muscle spasm 30 tablet 11    ondansetron (ZOFRAN-ODT) 4 MG TbDL Take 4 mg by mouth every 8 (eight) hours as needed.      rizatriptan (MAXALT) 10 MG tablet 1 tab PO PRN migraine. May repeat every 2 hours for max 3 tabs in 24 hours. Use no more than 10 days per month. 9 tablet 11    topiramate (TOPAMAX) 50 MG tablet Take 1 tablet (50 mg total) by mouth 2 (two) times daily. 60 tablet 11     No current facility-administered medications for this visit.       Past Medical History  Past Medical History:   Diagnosis Date    Acne     Menarche     Age of onset 10       Past Surgical History  Past Surgical History:    Procedure Laterality Date    TONSILLECTOMY      Tonsils         Family History  Family History   Problem Relation Age of Onset    Acne Brother     Breast cancer Paternal Grandmother     Migraines Father     Arthritis Mother     Migraines Sister     No Known Problems Sister     Colon cancer Neg Hx     Diabetes Neg Hx     Hypertension Neg Hx     Ovarian cancer Neg Hx     Melanoma Neg Hx     Lupus Neg Hx     Psoriasis Neg Hx     Eczema Neg Hx     Cancer Neg Hx        Social History  Social History     Socioeconomic History    Marital status: Single   Occupational History    Occupation: Student   Tobacco Use    Smoking status: Never    Smokeless tobacco: Never   Substance and Sexual Activity    Alcohol use: No    Drug use: No    Sexual activity: Yes     Birth control/protection: Condom   Other Topics Concern    Are you pregnant or think you may be? No    Breast-feeding No   Social History Narrative    Lives in a dorm room at school and comes home to both parents during her breaks    2 dog     3 siblings    1st year at [employer]        Review of Systems  14-point review of systems as follows:   No check arelis indicates NEGATIVE response   Constitutional: [] weight loss, [] change to appetite   Eyes: [x] change in vision, [] double vision   Ears, nose, mouth, throat: [] frequent nose bleeds, [] ringing in the ears   Respiratory: [] cough, [] wheezing   Cardiovascular: [x] chest pain, [] palpitations   Gastrointestinal: [] jaundice, [] nausea/vomiting   Genitourinary: [] incontinence, [] burning with urination   Hematologic/lymphatic: [] easy bruising/bleeding, [] night sweats   Neurological: [] numbness, [x] weakness   Endocrine: [] fatigue, [] heat/cold intolerance   Allergy/Immunologic: [] fevers, [] chills   Musculoskeletal: [] muscle pain, [] joint pain   Psychiatric: [] thoughts of harming self/others, [] depression   Integumentary: [] rashes, [] sores that do not heal     Objective:        Vitals:    09/22/23  1443   BP: (!) 144/91   Pulse: 65   Resp: 17   Temp: 97.9 °F (36.6 °C)       Body mass index is 43.4 kg/m².    9/22/23  Constitutional:   She appears well-developed and well-nourished. She is well groomed     Neurological Exam:  General: well-developed, well-nourished, no distress  Mental status: Awake and alert  Speech language: No dysarthria or aphasia on conversation  Cranial nerves: Face symmetric  Motor: Moves all extremities well  Coordination: No ataxia. No tremor.    8/24/23  Constitutional: appears in no acute distress, well-developed, well-nourished     Eyes: normal conjunctiva, PERRLA    Ears, nose, mouth, throat: external appearance of ears and nose normal, hearing intact     Cardiovascular: regular rate and rhythm, no murmurs appreciated    Respiratory: unlabored respirations, breath sounds normal bilaterally    Gastrointestinal: no visible abdominal masses, no guarding, no visible hernia    Musculoskeletal: normal tone in all four extremities. No abnormal movements. No pronator drift. No orbit. Symmetric finger tapping. Normal station. Normal regular gait. Normal tandem gait.      Spine:   CERVICAL SPINE:  ROM: mildly restricted    MUSCLE SPASM: no   FACET LOADING: no   SPURLING: no  JORDAN / PAMELA tender: no     Psychiatric: normal judgment and insight. Oriented to person, place, and time.     Neurologic:   Cortical functions: recent and remote memory intact, normal attention span and concentration, speech fluent, adequate fund of knowledge   Cranial nerves: visual fields full, PERRLA, EOMI, symmetric facial strength, hearing intact, palate elevates symmetrically, shoulder shrug 5/5, tongue protrudes midline   Reflexes: 2+ in the upper and lower extremities, no Lyons  Sensation: intact to temperature throughout   Coordination: normal finger to nose, heel to shin    Data Review:     I have personally reviewed the referring provider's notes, labs, & imaging made available to me today.      RADIOLOGY  STUDIES:  I have personally reviewed the pertinent images performed.       Results for orders placed or performed during the hospital encounter of 08/22/23   CT Head Without Contrast    Narrative    EXAMINATION:  CT HEAD WITHOUT CONTRAST    CLINICAL HISTORY:  Headache, chronic, new features or increased frequency;    TECHNIQUE:  Low dose axial images were obtained through the head.  Coronal and sagittal reformations were also performed. Contrast was not administered.    Automated exposure control radiation dose lowering technique was utilized.  The DLP is 549.    COMPARISON:  None.    FINDINGS:  Brain is normally formed.  There is no hydrocephalus or any abnormal extra-axial fluid collections.  No acute intracranial hemorrhages.  Within the supratentorial cerebral hemispheres the gray/white matter delineation is preserved.  No abnormal mass effects or midline shift or herniations.  There is no parenchymal hemorrhages.  In the posterior fossa the 4th ventricle is in midline.  No cerebellar hemorrhages or signs for cerebellar infarctions.    There is a borderline enlargement of the sella turcica with a partially empty sella.  This finding can sometimes be seen in patients with chronic intracranial hypertension.    Visualized ocular globes demonstrate no gross abnormalities.  Visualized paranasal air sinuses are clear.  Normal pneumatization of the mastoids.  Cranial vault fractures or osteoblastic or lytic lesions.      Impression    1. No acute intracranial processes.  2. Borderline enlargement of the sella turcica with a partially empty sella as above.      Electronically signed by: Nick Bee MD  Date:    08/22/2023  Time:    11:59       Lab Results   Component Value Date    AST 15 07/20/2015    ALT 22 07/20/2015    ALBUMIN 3.5 07/20/2015    PROT 7.1 07/20/2015    BILITOT 0.3 07/20/2015    CHOL 166 07/20/2015    HDL 30 (L) 07/20/2015    LDLCALC 99.8 07/20/2015    TRIG 181 (H) 07/20/2015       No results found for:  ""WBC", "HGB", "HCT", "MCV", "PLT"    No results found for: "TSH"        Assessment & Plan:       Problem List Items Addressed This Visit          Neuro    Migraine without aura and without status migrainosus, not intractable - Primary (Chronic)    Overview     Menstrual migraines since around age 16. Headaches are typically unilateral, moderate to severe in intensity, worsen with activity, pounding in quality and associated with sensitivity to light and sound.     Given sudden worsening with vision changes, recommend imaging. Need to evaluate for pseudotumor cerebri. Has upcoming visit with Dr. Kelsey. MRV and MRI indicate IIH.    Her typical frequency is once per month. Continue magnesium. Start Topamax for prevention. For acute attacks will add rizatriptan. Next step would be gepant.         Relevant Medications    topiramate (TOPAMAX) 50 MG tablet    rizatriptan (MAXALT) 10 MG tablet       Ophtho    Visual disturbance (Chronic)    Current Assessment & Plan     MRA from [facility] impression:"suspect bilateral transverse sinus stenoses, left greater than right". Will refer to vascular neurology due to vision changes          Relevant Orders    Ambulatory referral/consult to Vascular Neurology     Other Visit Diagnoses       Thyroid disorder screen        Relevant Orders    TSH    Vision changes        Relevant Orders    Comprehensive metabolic panel    Hemoglobin A1C    Abnormal MRA, brain        Relevant Orders    Ambulatory referral/consult to Vascular Neurology    IIH (idiopathic intracranial hypertension)        Relevant Orders    Ambulatory referral/consult to Vascular Neurology                  Please call our clinic at [phone] or send a message on the [employer] portal if there are any changes to the plan described below, for example,if you are not contacted for the requested tests, referral(s) within one week, if you are unable to receive the medications prescribed, or if you feel you need to change the " treatment course for any reason.     TESTING:  -- labs today     REFERRALS:  -- keep neuro-ophthalmology appt   -- vascular neurology    PREVENTION (use daily regardless of headache):  -- continue magnesium in ONE of the following preparations -               1. Magnesium oxide 800mg daily (the most common over the counter kind, may causes loose stools)              2. Magnesium citrate 400-500mg daily (harder to find, but more neutral on the bowels)              3. Magnesium glycinate 400mg daily (hardest to find, look online, but most bowel-neutral, best absorbed)   -- start topiramate as follows (keep total dose 150mg or under to not interfere with oral contraceptive)  Week 1: half tablet before bed  Week 2: half tablet twice a day  Week 3: half tablet in AM and full tablet before bed  Week 4: full tablet twice a day (50mg twice a day)      AS-NEEDED TREATMENT (use total no more than 10 days per month unless otherwise stated):  -- stop frovatriptan  -- START rizatriptan with next escalation to migraine. You can repeat two hours later if needed   -- continue tizanidine at night. This is a muscle relaxer and it will also make you potentially sleepy. Start with half a tablet to see how you respond but can take up to a whole tablet if needed  -- continue compazine. This is a nausea medication that also has anti-migraine properties. Can take daily and will not contribute to rebound headaches      Follow up in about 6 weeks (around 11/3/2023).    Kika Najera NP

## 2023-09-22 NOTE — PATIENT INSTRUCTIONS
Please call our clinic at [phone] or send a message on the [facility] portal if there are any changes to the plan described below, for example,if you are not contacted for the requested tests, referral(s) within one week, if you are unable to receive the medications prescribed, or if you feel you need to change the treatment course for any reason.     TESTING:  -- labs today     REFERRALS:  -- keep neuro-ophthalmology appt   -- vascular neurology    PREVENTION (use daily regardless of headache):  -- continue magnesium in ONE of the following preparations -               1. Magnesium oxide 800mg daily (the most common over the counter kind, may causes loose stools)              2. Magnesium citrate 400-500mg daily (harder to find, but more neutral on the bowels)              3. Magnesium glycinate 400mg daily (hardest to find, look online, but most bowel-neutral, best absorbed)   -- start topiramate as follows   Week 1: half tablet before bed  Week 2: half tablet twice a day  Week 3: half tablet in AM and full tablet before bed  Week 4: full tablet twice a day (50mg twice a day)      AS-NEEDED TREATMENT (use total no more than 10 days per month unless otherwise stated):  -- stop frovatriptan  -- START rizatriptan with next escalation to migraine. You can repeat two hours later if needed   -- continue tizanidine at night. This is a muscle relaxer and it will also make you potentially sleepy. Start with half a tablet to see how you respond but can take up to a whole tablet if needed  -- continue compazine. This is a nausea medication that also has anti-migraine properties. Can take daily and will not contribute to rebound headaches

## 2023-09-22 NOTE — ASSESSMENT & PLAN NOTE
"MRA from [facility] impression:"suspect bilateral transverse sinus stenoses, left greater than right". Will refer to vascular neurology due to vision changes   "

## 2023-09-25 ENCOUNTER — PATIENT MESSAGE (OUTPATIENT)
Dept: NEUROLOGY | Facility: CLINIC | Age: 23
End: 2023-09-25
Payer: COMMERCIAL

## 2023-09-25 DIAGNOSIS — R79.89 ABNORMAL TSH: Primary | ICD-10-CM

## 2023-09-26 ENCOUNTER — OFFICE VISIT (OUTPATIENT)
Dept: NEUROLOGY | Facility: CLINIC | Age: 23
End: 2023-09-26
Payer: COMMERCIAL

## 2023-09-26 VITALS — SYSTOLIC BLOOD PRESSURE: 153 MMHG | DIASTOLIC BLOOD PRESSURE: 116 MMHG | HEART RATE: 71 BPM

## 2023-09-26 DIAGNOSIS — R90.89 ABNORMAL MRA, BRAIN: ICD-10-CM

## 2023-09-26 DIAGNOSIS — E66.01 MORBID OBESITY WITH BMI OF 40.0-44.9, ADULT: Chronic | ICD-10-CM

## 2023-09-26 DIAGNOSIS — G93.2 IIH (IDIOPATHIC INTRACRANIAL HYPERTENSION): Primary | ICD-10-CM

## 2023-09-26 DIAGNOSIS — G43.009 MIGRAINE WITHOUT AURA AND WITHOUT STATUS MIGRAINOSUS, NOT INTRACTABLE: Chronic | ICD-10-CM

## 2023-09-26 DIAGNOSIS — H53.9 VISUAL DISTURBANCE: Chronic | ICD-10-CM

## 2023-09-26 PROCEDURE — 1160F PR REVIEW ALL MEDS BY PRESCRIBER/CLIN PHARMACIST DOCUMENTED: ICD-10-PCS | Mod: CPTII,S$GLB,, | Performed by: NURSE PRACTITIONER

## 2023-09-26 PROCEDURE — 1159F MED LIST DOCD IN RCRD: CPT | Mod: CPTII,S$GLB,, | Performed by: NURSE PRACTITIONER

## 2023-09-26 PROCEDURE — 99999 PR PBB SHADOW E&M-EST. PATIENT-LVL III: ICD-10-PCS | Mod: PBBFAC,,, | Performed by: NURSE PRACTITIONER

## 2023-09-26 PROCEDURE — 3044F PR MOST RECENT HEMOGLOBIN A1C LEVEL <7.0%: ICD-10-PCS | Mod: CPTII,S$GLB,, | Performed by: NURSE PRACTITIONER

## 2023-09-26 PROCEDURE — 99215 PR OFFICE/OUTPT VISIT, EST, LEVL V, 40-54 MIN: ICD-10-PCS | Mod: S$GLB,,, | Performed by: NURSE PRACTITIONER

## 2023-09-26 PROCEDURE — 3077F SYST BP >= 140 MM HG: CPT | Mod: CPTII,S$GLB,, | Performed by: NURSE PRACTITIONER

## 2023-09-26 PROCEDURE — 3080F PR MOST RECENT DIASTOLIC BLOOD PRESSURE >= 90 MM HG: ICD-10-PCS | Mod: CPTII,S$GLB,, | Performed by: NURSE PRACTITIONER

## 2023-09-26 PROCEDURE — 1160F RVW MEDS BY RX/DR IN RCRD: CPT | Mod: CPTII,S$GLB,, | Performed by: NURSE PRACTITIONER

## 2023-09-26 PROCEDURE — 99215 OFFICE O/P EST HI 40 MIN: CPT | Mod: S$GLB,,, | Performed by: NURSE PRACTITIONER

## 2023-09-26 PROCEDURE — 3044F HG A1C LEVEL LT 7.0%: CPT | Mod: CPTII,S$GLB,, | Performed by: NURSE PRACTITIONER

## 2023-09-26 PROCEDURE — 3077F PR MOST RECENT SYSTOLIC BLOOD PRESSURE >= 140 MM HG: ICD-10-PCS | Mod: CPTII,S$GLB,, | Performed by: NURSE PRACTITIONER

## 2023-09-26 PROCEDURE — 1159F PR MEDICATION LIST DOCUMENTED IN MEDICAL RECORD: ICD-10-PCS | Mod: CPTII,S$GLB,, | Performed by: NURSE PRACTITIONER

## 2023-09-26 PROCEDURE — 3080F DIAST BP >= 90 MM HG: CPT | Mod: CPTII,S$GLB,, | Performed by: NURSE PRACTITIONER

## 2023-09-26 PROCEDURE — 99999 PR PBB SHADOW E&M-EST. PATIENT-LVL III: CPT | Mod: PBBFAC,,, | Performed by: NURSE PRACTITIONER

## 2023-09-27 PROBLEM — G93.2 IIH (IDIOPATHIC INTRACRANIAL HYPERTENSION): Status: ACTIVE | Noted: 2023-09-27

## 2023-09-27 NOTE — PROGRESS NOTES
"[facility] VISIT      SUBJECTIVE:    History for Present Illness: Malinda Brady is a 23 y.o.  female  with PMHx of migraines ( treatment by Kika Najera NP) who presents to me as a referral for abnormal imaging finding    Relevant HPI:    Patient with a long history of migraine since approximately the age of 16. headaches are typically unilateral, moderate severe intensity, worsened with activity, pounding quality and associated with substantive to light and sound.   sudden worsening of migraine in August.  Patient seen in ER on 08/ 14/23 for migraine  That would not break.  She was prescribed steroids. since ED visit, patient  reports an  new change in  vision specifically  her peripheral vision is "gone" and distal vision is significantly blurry.    Interval history:    At the time of today's visit, the patient denies new or worsening focal neurologic symptoms concerning for new stroke or TIA. She continues to endorse headaches which are atypical from her typical migraines.  Patient describes the pain as throbbing located in the frontal and occipital regions; positive whooshing bilaterally right> left; changes in vision with decreased peripheral vision and bilateral blurriness , began in August of 2023.  Patient was prescribed Topamax (not started) by Kika Najera.  She is scheduled to see Neuro-Ophthalmology on 10/05/2023.  She denies associated vertigo, double vision, focal weakness or numbness, gait imbalance,  or language disturbance.    Past Medical History:   Diagnosis Date    Acne     Menarche     Age of onset 10     Past Surgical History:   Procedure Laterality Date    TONSILLECTOMY      Tonsils       Family History   Problem Relation Age of Onset    Acne Brother     Breast cancer Paternal Grandmother     Migraines Father     Arthritis Mother     Migraines Sister     No Known Problems Sister     Colon cancer Neg Hx     Diabetes Neg Hx     Hypertension Neg Hx     " Ovarian cancer Neg Hx     Melanoma Neg Hx     Lupus Neg Hx     Psoriasis Neg Hx     Eczema Neg Hx     Cancer Neg Hx         Current Outpatient Medications:     prochlorperazine (COMPAZINE) 10 MG tablet, Take 1 tablet (10 mg total) by mouth every 6 (six) hours as needed (migraine or nausea)., Disp: 60 tablet, Rfl: 11    rizatriptan (MAXALT) 10 MG tablet, 1 tab PO PRN migraine. May repeat every 2 hours for max 3 tabs in 24 hours. Use no more than 10 days per month., Disp: 9 tablet, Rfl: 11    tiZANidine (ZANAFLEX) 4 MG tablet, Half or full tablet by mouth at night as needed for muscle spasm, Disp: 30 tablet, Rfl: 11    topiramate (TOPAMAX) 50 MG tablet, Take 1 tablet (50 mg total) by mouth 2 (two) times daily., Disp: 60 tablet, Rfl: 11    ondansetron (ZOFRAN-ODT) 4 MG TbDL, Take 4 mg by mouth every 8 (eight) hours as needed., Disp: , Rfl:      Review of Systems:  Review of systems is negative except for the symptoms mentioned in HPI    Physical exam:    BP (!) 153/116   Pulse 71   LMP 09/14/2023 (Exact Date)     General: Well-developed, well-groomed. No apparent distress  HENT: Normocephalic, atraumatic.    Cardiovascular: Regular rate and rhythm  Chest: No audible wheezes, stridor, ronchi appreciated.  Musculoskeletal: No peripheral edema     Neurologic Exam: The patient is awake, alert and oriented to person, place, time and situation. Attentive, vigilant during exam. Language is fluent. Naming & repetition intact, +2-step commands.  Fund of knowledge is appropriate. Well organized thoughts.     Cranial nerves:   CN II: Visual fields are full to confrontation. Pupils are 4 mm and briskly reactive to light.  CN III, IV, VI: EOMI, no nystagmus, no ptosis  CN V: Facial sensation is intact in all 3 divisions bilaterally.  CN VII: Face is symmetric with normal eye closure and smile.  CN VIII: Hearing is normal bilaterally  CN IX, X: Palate elevates symmetrically. Phonation is normal.  CN XI: Head turning and shoulder  shrug are intact  CN XII: Tongue is midline with normal movements and no atrophy.     Motor examination of all extremities demonstrates normal bulk and tone in all four limbs. There are no atrophy or fasciculations.        Left Right     Left Right   Deltoid 5/5 5/5   Hip Flexion 5/5 5/5   Biceps 5/5 5/5   Hip Extension 5/5 5/5   Triceps 5/5 5/5   Knee Flexion 5/5 5/5   Wrist Ext 5/5 5/5   Knee Extension 5/5 5/5   Finger Abd 5/5 5/5   Ankle dorsiflex 5/5 5/5           Ankle plantar flex 5/5 5/5     Sensory examination is normal light touch in BUE and BLE.    Deep tendon reflexes No clonus. Negative Lyons's     Gait: Normal tandem, and casual gait.     Coordination: No dysmetria with finger-to-nose. Rapid alternating movements and fine finger movements are intact.         Relevant Labwork:  Recent Labs   Lab 09/22/23  1542   Hemoglobin A1C 4.7       Diagnostic Results:  Imaging was independently reviewed by myself, along with the associated radiology report    Brain Imaging   MRI of the brain with/without OSH 09/06/2023:  Bilateral transverse/sigmoid sinus stenosis  Vessel Imaging     Cardiac Imaging     Assessment:  Malinda Brady  is a 23 y.o.  female  seen today in clinic for follow-up assessment and recommendations. The following recommendations and plan were discussed in depth with the patient who voiced understanding and was in agreement.  Plan:  Idiopathic intracranial hypertension  Current history, symptoms and imaging findings concerning for underlying idiopathic intracranial hypertension  Discussed intracranial  pathology;  diagnosis & imaging findings.   Counseled on the available treatment options including, conservative management and endovascular therapy options .Patient verbalized understanding of risk and benefits and would like to move forward with endovascular management.All questions were answered to the patients satisfaction.  -Encourage patient to start Topamax as prescribed for symptom  relief  -Counseld on need for DAPT with Aspirin 325 mg daily and Plavix 75 mg daily prior to and for 3 months post procedure.   -Rx for Plavix  75 mg sent to pharmacy. Patient instructed to beging DAPT 14 days prior to procedure.  -Continue with scheduled Neuro-Ophthalmology visit on 10/05/2023  -Pre-Procedure labs: CBC,BMP,PT/INR, Platelet response test   -counseled on lifestyle modifications including diet and weight management    Migraine  -continue to follow-up outpatient with Kika Najera for surveillance and long-term management     Patient/Family teaching provided during this visit  -Identifying the signs and symptoms of stroke; emergency action and ER attention  -Risk factor control  -Optimization for secondary stroke prevention including compliance with current medications       I will plan on having Malinda return to clinic following endovascular procedure. The patient can contact my office with any questions or concerns they may have as they arise in the interim.       Collaborating Physician, Dr Haynes, was available during today's encounter. Any change to plan along with cosign to appear in the EMR    JUAN Cooper  Department of Vascular Neurology  [facility]  [phone]    This note is dictated on M*Modal Fluency Direct word recognition program. There are word recognition mistakes that are occasionally missed on review

## 2023-10-03 ENCOUNTER — PATIENT MESSAGE (OUTPATIENT)
Dept: NEUROLOGY | Facility: CLINIC | Age: 23
End: 2023-10-03
Payer: COMMERCIAL

## 2023-10-04 ENCOUNTER — PATIENT MESSAGE (OUTPATIENT)
Dept: NEUROLOGY | Facility: CLINIC | Age: 23
End: 2023-10-04
Payer: COMMERCIAL

## 2023-10-04 RX ORDER — CLOPIDOGREL BISULFATE 75 MG/1
75 TABLET ORAL DAILY
Qty: 30 TABLET | Refills: 11 | Status: SHIPPED | OUTPATIENT
Start: 2023-10-04 | End: 2024-10-03

## 2023-10-05 ENCOUNTER — LAB VISIT (OUTPATIENT)
Dept: LAB | Facility: HOSPITAL | Age: 23
End: 2023-10-05
Payer: COMMERCIAL

## 2023-10-05 ENCOUNTER — OFFICE VISIT (OUTPATIENT)
Dept: OPHTHALMOLOGY | Facility: CLINIC | Age: 23
End: 2023-10-05
Payer: COMMERCIAL

## 2023-10-05 ENCOUNTER — CLINICAL SUPPORT (OUTPATIENT)
Dept: OPHTHALMOLOGY | Facility: CLINIC | Age: 23
End: 2023-10-05
Payer: COMMERCIAL

## 2023-10-05 DIAGNOSIS — H47.11 PAPILLEDEMA ASSOCIATED WITH INCREASED INTRACRANIAL PRESSURE: ICD-10-CM

## 2023-10-05 DIAGNOSIS — G93.2 IIH (IDIOPATHIC INTRACRANIAL HYPERTENSION): Primary | ICD-10-CM

## 2023-10-05 DIAGNOSIS — G93.2 IIH (IDIOPATHIC INTRACRANIAL HYPERTENSION): ICD-10-CM

## 2023-10-05 LAB
BASOPHILS # BLD AUTO: 0.06 K/UL (ref 0–0.2)
BASOPHILS NFR BLD: 0.5 % (ref 0–1.9)
DIFFERENTIAL METHOD: ABNORMAL
EOSINOPHIL # BLD AUTO: 0.1 K/UL (ref 0–0.5)
EOSINOPHIL NFR BLD: 1.1 % (ref 0–8)
ERYTHROCYTE [DISTWIDTH] IN BLOOD BY AUTOMATED COUNT: 12.4 % (ref 11.5–14.5)
HCT VFR BLD AUTO: 48.1 % (ref 37–48.5)
HGB BLD-MCNC: 16.2 G/DL (ref 12–16)
IMM GRANULOCYTES # BLD AUTO: 0.08 K/UL (ref 0–0.04)
IMM GRANULOCYTES NFR BLD AUTO: 0.7 % (ref 0–0.5)
INR PPP: 0.9 (ref 0.8–1.2)
LYMPHOCYTES # BLD AUTO: 2.6 K/UL (ref 1–4.8)
LYMPHOCYTES NFR BLD: 23.7 % (ref 18–48)
MCH RBC QN AUTO: 30.2 PG (ref 27–31)
MCHC RBC AUTO-ENTMCNC: 33.7 G/DL (ref 32–36)
MCV RBC AUTO: 90 FL (ref 82–98)
MONOCYTES # BLD AUTO: 0.7 K/UL (ref 0.3–1)
MONOCYTES NFR BLD: 6.5 % (ref 4–15)
NEUTROPHILS # BLD AUTO: 7.5 K/UL (ref 1.8–7.7)
NEUTROPHILS NFR BLD: 67.5 % (ref 38–73)
NRBC BLD-RTO: 0 /100 WBC
PLATELET # BLD AUTO: 247 K/UL (ref 150–450)
PLATELET RESPONSE PLAVIX: 163 PRU (ref 194–418)
PMV BLD AUTO: 10.6 FL (ref 9.2–12.9)
PROTHROMBIN TIME: 10.2 SEC (ref 9–12.5)
RBC # BLD AUTO: 5.36 M/UL (ref 4–5.4)
WBC # BLD AUTO: 11.15 K/UL (ref 3.9–12.7)

## 2023-10-05 PROCEDURE — 92133 OCT, OPTIC NERVE - OU - BOTH EYES: ICD-10-PCS | Mod: S$GLB,,, | Performed by: OPHTHALMOLOGY

## 2023-10-05 PROCEDURE — 85576 BLOOD PLATELET AGGREGATION: CPT | Performed by: NURSE PRACTITIONER

## 2023-10-05 PROCEDURE — 92133 CPTRZD OPH DX IMG PST SGM ON: CPT | Mod: S$GLB,,, | Performed by: OPHTHALMOLOGY

## 2023-10-05 PROCEDURE — 99204 PR OFFICE/OUTPT VISIT, NEW, LEVL IV, 45-59 MIN: ICD-10-PCS | Mod: S$GLB,,, | Performed by: OPHTHALMOLOGY

## 2023-10-05 PROCEDURE — 3044F PR MOST RECENT HEMOGLOBIN A1C LEVEL <7.0%: ICD-10-PCS | Mod: CPTII,S$GLB,, | Performed by: OPHTHALMOLOGY

## 2023-10-05 PROCEDURE — 3044F HG A1C LEVEL LT 7.0%: CPT | Mod: CPTII,S$GLB,, | Performed by: OPHTHALMOLOGY

## 2023-10-05 PROCEDURE — 36415 COLL VENOUS BLD VENIPUNCTURE: CPT | Performed by: NURSE PRACTITIONER

## 2023-10-05 PROCEDURE — 1159F PR MEDICATION LIST DOCUMENTED IN MEDICAL RECORD: ICD-10-PCS | Mod: CPTII,S$GLB,, | Performed by: OPHTHALMOLOGY

## 2023-10-05 PROCEDURE — 99999 PR PBB SHADOW E&M-EST. PATIENT-LVL III: CPT | Mod: PBBFAC,,, | Performed by: OPHTHALMOLOGY

## 2023-10-05 PROCEDURE — 85025 COMPLETE CBC W/AUTO DIFF WBC: CPT | Performed by: NURSE PRACTITIONER

## 2023-10-05 PROCEDURE — 92083 EXTENDED VISUAL FIELD XM: CPT | Mod: S$GLB,,, | Performed by: OPHTHALMOLOGY

## 2023-10-05 PROCEDURE — 1159F MED LIST DOCD IN RCRD: CPT | Mod: CPTII,S$GLB,, | Performed by: OPHTHALMOLOGY

## 2023-10-05 PROCEDURE — 1160F PR REVIEW ALL MEDS BY PRESCRIBER/CLIN PHARMACIST DOCUMENTED: ICD-10-PCS | Mod: CPTII,S$GLB,, | Performed by: OPHTHALMOLOGY

## 2023-10-05 PROCEDURE — 92083 HUMPHREY VISUAL FIELD - OU - BOTH EYES: ICD-10-PCS | Mod: S$GLB,,, | Performed by: OPHTHALMOLOGY

## 2023-10-05 PROCEDURE — 99204 OFFICE O/P NEW MOD 45 MIN: CPT | Mod: S$GLB,,, | Performed by: OPHTHALMOLOGY

## 2023-10-05 PROCEDURE — 85610 PROTHROMBIN TIME: CPT | Performed by: NURSE PRACTITIONER

## 2023-10-05 PROCEDURE — 1160F RVW MEDS BY RX/DR IN RCRD: CPT | Mod: CPTII,S$GLB,, | Performed by: OPHTHALMOLOGY

## 2023-10-05 PROCEDURE — 99999 PR PBB SHADOW E&M-EST. PATIENT-LVL III: ICD-10-PCS | Mod: PBBFAC,,, | Performed by: OPHTHALMOLOGY

## 2023-10-05 NOTE — LETTER
[facility] - [address]  [address]  [facility] [address]  Phone: [phone]  Fax: [phone]   October 5, 2023    Kika Najera, MANDEEP  [address] [facility] [place] [address]    Patient: Malinda Brady   MR Number: [id number]   YOB: 2000   Date of Visit: 10/5/2023       Dear Dr. Najera:    Thank you for referring Malinda Brady to me for evaluation. Here is my assessment and plan of care:    Assessment/Plan    For exam results, see Encounter Report.    IIH (idiopathic intracranial hypertension)  -     Leon Visual Field - OU - Extended - Both Eyes; Future  -     OCT, Optic Nerve - OU - Both Eyes; Future    Papilledema associated with increased intracranial pressure      Can go ahead with stent procedure. Repeat exam and HVF in 3 weeks.           Below you will find my full exam findings. If you have questions, please do not hesitate to call me. I look forward to following Ms. Malinda Brady along with you.    Sincerely,          Art Kelsey MD       CC  No Recipients             Base Eye Exam       Visual Acuity (Snellen - Linear)         Right Left    Dist sc 20/200 20/200    Dist ph sc 20/50 -2 20/80 -2              Tonometry (Tonopen, 10:07 AM)         Right Left    Pressure 21 17              Pupils         Dark Light Shape React APD    Right 5 3 Round Brisk None    Left 5 3 Round Brisk None              Extraocular Movement         Right Left     Full, Ortho Full, Ortho              Neuro/Psych       Oriented x3: Yes    Mood/Affect: Normal              Dilation       Both eyes: 2.5% Phenylephrine, 1% Mydriacyl @ 10:07 AM                  Slit Lamp and Fundus Exam       External Exam         Right Left    External Normal Normal              Slit Lamp Exam         Right Left    Lids/Lashes Normal Normal    Conjunctiva/Sclera White and quiet White and quiet    Cornea Clear Clear    Anterior Chamber Deep and quiet Deep and quiet    Iris Round and reactive Round and reactive     Lens Clear Clear    Vitreous Normal Normal              Fundus Exam         Right Left    Disc 3+ Optic disc edema 1+ Optic disc edema    C/D Ratio 0.1 0.1    Macula Normal Normal    Vessels Tortuous Tortuous    Periphery Normal Normal

## 2023-10-06 ENCOUNTER — PATIENT MESSAGE (OUTPATIENT)
Dept: INTERVENTIONAL RADIOLOGY/VASCULAR | Facility: HOSPITAL | Age: 23
End: 2023-10-06
Payer: COMMERCIAL

## 2023-10-06 ENCOUNTER — PATIENT MESSAGE (OUTPATIENT)
Dept: NEUROLOGY | Facility: CLINIC | Age: 23
End: 2023-10-06
Payer: COMMERCIAL

## 2023-10-06 ENCOUNTER — TELEPHONE (OUTPATIENT)
Dept: NEUROLOGY | Facility: CLINIC | Age: 23
End: 2023-10-06

## 2023-10-09 ENCOUNTER — HOSPITAL ENCOUNTER (INPATIENT)
Dept: INTERVENTIONAL RADIOLOGY/VASCULAR | Facility: HOSPITAL | Age: 23
LOS: 1 days | Discharge: HOME | DRG: 025 | End: 2023-10-10
Attending: INTERNAL MEDICINE | Admitting: INTERNAL MEDICINE
Payer: COMMERCIAL

## 2023-10-09 ENCOUNTER — ANESTHESIA EVENT (OUTPATIENT)
Dept: INTERVENTIONAL RADIOLOGY/VASCULAR | Facility: HOSPITAL | Age: 23
DRG: 025 | End: 2023-10-09
Payer: COMMERCIAL

## 2023-10-09 DIAGNOSIS — G08: ICD-10-CM

## 2023-10-09 DIAGNOSIS — G93.2 IIH (IDIOPATHIC INTRACRANIAL HYPERTENSION): ICD-10-CM

## 2023-10-09 PROBLEM — H47.10 PAPILLEDEMA: Status: ACTIVE | Noted: 2023-10-09

## 2023-10-09 LAB
ABO GROUP BLD: NORMAL
B-HCG UR QL: NEGATIVE
BLD GP AB SCN CELLS X3 SERPL QL: NORMAL
CHOLEST SERPL-MCNC: 128 MG/DL (ref 120–199)
CHOLEST/HDLC SERPL: 4.9 {RATIO} (ref 2–5)
CTP QC/QA: YES
ESTIMATED AVG GLUCOSE: 91 MG/DL (ref 68–131)
HBA1C MFR BLD: 4.8 % (ref 4–5.6)
HDLC SERPL-MCNC: 26 MG/DL (ref 40–75)
HDLC SERPL: 20.3 % (ref 20–50)
LDLC SERPL CALC-MCNC: 88.4 MG/DL (ref 63–159)
NONHDLC SERPL-MCNC: 102 MG/DL
POCT GLUCOSE: 67 MG/DL (ref 70–110)
RH BLD: NORMAL
SPECIMEN OUTDATE: NORMAL
T3 SERPL-MCNC: 115 NG/DL (ref 60–180)
T4 FREE SERPL-MCNC: 0.89 NG/DL (ref 0.71–1.51)
TRIGL SERPL-MCNC: 68 MG/DL (ref 30–150)
TSH SERPL DL<=0.005 MIU/L-ACNC: 1.02 UIU/ML (ref 0.4–4)

## 2023-10-09 PROCEDURE — D9220A PRA ANESTHESIA: ICD-10-PCS | Mod: ANES,,, | Performed by: ANESTHESIOLOGY

## 2023-10-09 PROCEDURE — 75860 VEIN X-RAY NECK: CPT | Mod: TC | Performed by: PSYCHIATRY & NEUROLOGY

## 2023-10-09 PROCEDURE — 63600175 PHARM REV CODE 636 W HCPCS: Performed by: NURSE ANESTHETIST, CERTIFIED REGISTERED

## 2023-10-09 PROCEDURE — 84480 ASSAY TRIIODOTHYRONINE (T3): CPT

## 2023-10-09 PROCEDURE — 36012 PLACE CATHETER IN VEIN: CPT | Performed by: PSYCHIATRY & NEUROLOGY

## 2023-10-09 PROCEDURE — 37000008 HC ANESTHESIA 1ST 15 MINUTES

## 2023-10-09 PROCEDURE — 25000003 PHARM REV CODE 250: Performed by: NURSE ANESTHETIST, CERTIFIED REGISTERED

## 2023-10-09 PROCEDURE — 36224 PLACE CATH CAROTD ART: CPT | Mod: 50 | Performed by: PSYCHIATRY & NEUROLOGY

## 2023-10-09 PROCEDURE — 81025 URINE PREGNANCY TEST: CPT | Performed by: FAMILY MEDICINE

## 2023-10-09 PROCEDURE — 36224 PR ANGIO INTRCRNL ART +/- CERVIOCEREBRAL ARCH, INTRNL CAROTID ART, SELECTV CATH ,S&I: ICD-10-PCS | Mod: 50,,, | Performed by: PSYCHIATRY & NEUROLOGY

## 2023-10-09 PROCEDURE — 37238 IR STENT INTRACRANIAL W/PTA AND IMAGING: ICD-10-PCS | Mod: ,,, | Performed by: PSYCHIATRY & NEUROLOGY

## 2023-10-09 PROCEDURE — 37238 OPEN/PERQ PLACE STENT SAME: CPT | Performed by: PSYCHIATRY & NEUROLOGY

## 2023-10-09 PROCEDURE — 86900 BLOOD TYPING SEROLOGIC ABO: CPT

## 2023-10-09 PROCEDURE — 83036 HEMOGLOBIN GLYCOSYLATED A1C: CPT

## 2023-10-09 PROCEDURE — D9220A PRA ANESTHESIA: Mod: ANES,,, | Performed by: ANESTHESIOLOGY

## 2023-10-09 PROCEDURE — 36012 PR PLACE CATH IN VEIN,SUBSELECT: ICD-10-PCS | Mod: 51,,, | Performed by: PSYCHIATRY & NEUROLOGY

## 2023-10-09 PROCEDURE — 36226 PLACE CATH VERTEBRAL ART: CPT | Mod: 50,,, | Performed by: PSYCHIATRY & NEUROLOGY

## 2023-10-09 PROCEDURE — 36224 PLACE CATH CAROTD ART: CPT | Mod: 50,,, | Performed by: PSYCHIATRY & NEUROLOGY

## 2023-10-09 PROCEDURE — 75870 VEIN X-RAY SKULL: CPT | Mod: 26,59,, | Performed by: PSYCHIATRY & NEUROLOGY

## 2023-10-09 PROCEDURE — 94761 N-INVAS EAR/PLS OXIMETRY MLT: CPT

## 2023-10-09 PROCEDURE — 80061 LIPID PANEL: CPT

## 2023-10-09 PROCEDURE — 86901 BLOOD TYPING SEROLOGIC RH(D): CPT

## 2023-10-09 PROCEDURE — 36226 PR ANGIO VERTEBRAL ARTERY +/- CERVIOCEREBRAL ARCH, VERTEBRAL ART, SELECTV CATH,S&I: ICD-10-PCS | Mod: 50,,, | Performed by: PSYCHIATRY & NEUROLOGY

## 2023-10-09 PROCEDURE — 36226 PLACE CATH VERTEBRAL ART: CPT | Mod: 50 | Performed by: PSYCHIATRY & NEUROLOGY

## 2023-10-09 PROCEDURE — 75870 VEIN X-RAY SKULL: CPT | Mod: TC | Performed by: PSYCHIATRY & NEUROLOGY

## 2023-10-09 PROCEDURE — 37238 OPEN/PERQ PLACE STENT SAME: CPT | Mod: ,,, | Performed by: PSYCHIATRY & NEUROLOGY

## 2023-10-09 PROCEDURE — 86850 RBC ANTIBODY SCREEN: CPT

## 2023-10-09 PROCEDURE — 27201076 IR STENT INTRACRANIAL W/PTA AND IMAGING

## 2023-10-09 PROCEDURE — 36012 PLACE CATHETER IN VEIN: CPT | Mod: 51,,, | Performed by: PSYCHIATRY & NEUROLOGY

## 2023-10-09 PROCEDURE — D9220A PRA ANESTHESIA: Mod: CRNA,,, | Performed by: NURSE ANESTHETIST, CERTIFIED REGISTERED

## 2023-10-09 PROCEDURE — C1769 GUIDE WIRE: HCPCS

## 2023-10-09 PROCEDURE — G0269 OCCLUSIVE DEVICE IN VEIN ART: HCPCS | Performed by: PSYCHIATRY & NEUROLOGY

## 2023-10-09 PROCEDURE — 25000003 PHARM REV CODE 250

## 2023-10-09 PROCEDURE — 75860 VEIN X-RAY NECK: CPT | Mod: 26,59,, | Performed by: PSYCHIATRY & NEUROLOGY

## 2023-10-09 PROCEDURE — 20000000 HC ICU ROOM

## 2023-10-09 PROCEDURE — 75870 PR  VENOGRAM SUPER SAG SINUS: ICD-10-PCS | Mod: 26,59,, | Performed by: PSYCHIATRY & NEUROLOGY

## 2023-10-09 PROCEDURE — 37000009 HC ANESTHESIA EA ADD 15 MINS

## 2023-10-09 PROCEDURE — 25500020 PHARM REV CODE 255: Performed by: PSYCHIATRY & NEUROLOGY

## 2023-10-09 PROCEDURE — 75860 PR  VENOGRAM SINUS/JUGULAR: ICD-10-PCS | Mod: 26,59,, | Performed by: PSYCHIATRY & NEUROLOGY

## 2023-10-09 PROCEDURE — 84439 ASSAY OF FREE THYROXINE: CPT

## 2023-10-09 PROCEDURE — 84443 ASSAY THYROID STIM HORMONE: CPT

## 2023-10-09 PROCEDURE — D9220A PRA ANESTHESIA: ICD-10-PCS | Mod: CRNA,,, | Performed by: NURSE ANESTHETIST, CERTIFIED REGISTERED

## 2023-10-09 RX ORDER — POLYETHYLENE GLYCOL 3350 17 G/17G
17 POWDER, FOR SOLUTION ORAL DAILY
Status: DISCONTINUED | OUTPATIENT
Start: 2023-10-09 | End: 2023-10-10 | Stop reason: HOSPADM

## 2023-10-09 RX ORDER — ACETAMINOPHEN 325 MG/1
650 TABLET ORAL EVERY 6 HOURS PRN
Status: DISCONTINUED | OUTPATIENT
Start: 2023-10-09 | End: 2023-10-10 | Stop reason: HOSPADM

## 2023-10-09 RX ORDER — PROPOFOL 10 MG/ML
VIAL (ML) INTRAVENOUS
Status: DISCONTINUED | OUTPATIENT
Start: 2023-10-09 | End: 2023-10-09

## 2023-10-09 RX ORDER — SODIUM,POTASSIUM PHOSPHATES 280-250MG
2 POWDER IN PACKET (EA) ORAL
Status: DISCONTINUED | OUTPATIENT
Start: 2023-10-09 | End: 2023-10-10 | Stop reason: HOSPADM

## 2023-10-09 RX ORDER — SODIUM CHLORIDE 0.9 % (FLUSH) 0.9 %
10 SYRINGE (ML) INJECTION
Status: DISCONTINUED | OUTPATIENT
Start: 2023-10-09 | End: 2023-10-09

## 2023-10-09 RX ORDER — ASPIRIN 325 MG
325 TABLET ORAL DAILY
Status: DISCONTINUED | OUTPATIENT
Start: 2023-10-09 | End: 2023-10-10 | Stop reason: HOSPADM

## 2023-10-09 RX ORDER — LANOLIN ALCOHOL/MO/W.PET/CERES
800 CREAM (GRAM) TOPICAL
Status: DISCONTINUED | OUTPATIENT
Start: 2023-10-09 | End: 2023-10-10 | Stop reason: HOSPADM

## 2023-10-09 RX ORDER — CLOPIDOGREL BISULFATE 75 MG/1
75 TABLET ORAL DAILY
Status: DISCONTINUED | OUTPATIENT
Start: 2023-10-09 | End: 2023-10-10 | Stop reason: HOSPADM

## 2023-10-09 RX ORDER — MIDAZOLAM HYDROCHLORIDE 1 MG/ML
INJECTION, SOLUTION INTRAMUSCULAR; INTRAVENOUS
Status: DISCONTINUED | OUTPATIENT
Start: 2023-10-09 | End: 2023-10-09

## 2023-10-09 RX ORDER — NAPROXEN SODIUM 220 MG/1
81 TABLET, FILM COATED ORAL DAILY
Status: DISCONTINUED | OUTPATIENT
Start: 2023-10-09 | End: 2023-10-09

## 2023-10-09 RX ORDER — SODIUM CHLORIDE 9 MG/ML
INJECTION, SOLUTION INTRAVENOUS CONTINUOUS
Status: DISCONTINUED | OUTPATIENT
Start: 2023-10-09 | End: 2023-10-10

## 2023-10-09 RX ORDER — LIDOCAINE HYDROCHLORIDE 20 MG/ML
INJECTION, SOLUTION EPIDURAL; INFILTRATION; INTRACAUDAL; PERINEURAL
Status: DISCONTINUED | OUTPATIENT
Start: 2023-10-09 | End: 2023-10-09

## 2023-10-09 RX ORDER — ONDANSETRON 2 MG/ML
4 INJECTION INTRAMUSCULAR; INTRAVENOUS EVERY 8 HOURS PRN
Status: DISCONTINUED | OUTPATIENT
Start: 2023-10-09 | End: 2023-10-10 | Stop reason: HOSPADM

## 2023-10-09 RX ORDER — FENTANYL CITRATE 50 UG/ML
INJECTION, SOLUTION INTRAMUSCULAR; INTRAVENOUS
Status: DISCONTINUED | OUTPATIENT
Start: 2023-10-09 | End: 2023-10-09

## 2023-10-09 RX ORDER — ROCURONIUM BROMIDE 10 MG/ML
INJECTION, SOLUTION INTRAVENOUS
Status: DISCONTINUED | OUTPATIENT
Start: 2023-10-09 | End: 2023-10-09

## 2023-10-09 RX ORDER — LABETALOL HCL 20 MG/4 ML
10 SYRINGE (ML) INTRAVENOUS EVERY 4 HOURS PRN
Status: DISCONTINUED | OUTPATIENT
Start: 2023-10-09 | End: 2023-10-10 | Stop reason: HOSPADM

## 2023-10-09 RX ORDER — DEXAMETHASONE SODIUM PHOSPHATE 4 MG/ML
INJECTION, SOLUTION INTRA-ARTICULAR; INTRALESIONAL; INTRAMUSCULAR; INTRAVENOUS; SOFT TISSUE
Status: DISCONTINUED | OUTPATIENT
Start: 2023-10-09 | End: 2023-10-09

## 2023-10-09 RX ORDER — ONDANSETRON 2 MG/ML
INJECTION INTRAMUSCULAR; INTRAVENOUS
Status: DISCONTINUED | OUTPATIENT
Start: 2023-10-09 | End: 2023-10-09

## 2023-10-09 RX ORDER — IODIXANOL 320 MG/ML
200 INJECTION, SOLUTION INTRAVASCULAR
Status: COMPLETED | OUTPATIENT
Start: 2023-10-09 | End: 2023-10-09

## 2023-10-09 RX ORDER — AMOXICILLIN 250 MG
1 CAPSULE ORAL 2 TIMES DAILY
Status: DISCONTINUED | OUTPATIENT
Start: 2023-10-09 | End: 2023-10-10 | Stop reason: HOSPADM

## 2023-10-09 RX ADMIN — ACETAMINOPHEN 650 MG: 325 TABLET ORAL at 07:10

## 2023-10-09 RX ADMIN — POLYETHYLENE GLYCOL 3350 17 G: 17 POWDER, FOR SOLUTION ORAL at 03:10

## 2023-10-09 RX ADMIN — ROCURONIUM BROMIDE 50 MG: 10 INJECTION INTRAVENOUS at 10:10

## 2023-10-09 RX ADMIN — SODIUM CHLORIDE: 0.9 INJECTION, SOLUTION INTRAVENOUS at 09:10

## 2023-10-09 RX ADMIN — MIDAZOLAM 2 MG: 1 INJECTION INTRAMUSCULAR; INTRAVENOUS at 10:10

## 2023-10-09 RX ADMIN — SUGAMMADEX 300 MG: 100 INJECTION, SOLUTION INTRAVENOUS at 11:10

## 2023-10-09 RX ADMIN — ONDANSETRON 4 MG: 2 INJECTION INTRAMUSCULAR; INTRAVENOUS at 11:10

## 2023-10-09 RX ADMIN — ACETAMINOPHEN 650 MG: 325 TABLET ORAL at 01:10

## 2023-10-09 RX ADMIN — LIDOCAINE HYDROCHLORIDE 50 MG: 20 INJECTION, SOLUTION EPIDURAL; INFILTRATION; INTRACAUDAL at 10:10

## 2023-10-09 RX ADMIN — ASPIRIN 325 MG ORAL TABLET 325 MG: 325 PILL ORAL at 03:10

## 2023-10-09 RX ADMIN — FENTANYL CITRATE 100 MCG: 50 INJECTION INTRAMUSCULAR; INTRAVENOUS at 10:10

## 2023-10-09 RX ADMIN — PROPOFOL 200 MG: 10 INJECTION, EMULSION INTRAVENOUS at 10:10

## 2023-10-09 RX ADMIN — IODIXANOL 150 ML: 320 INJECTION, SOLUTION INTRAVASCULAR at 12:10

## 2023-10-09 RX ADMIN — CLOPIDOGREL BISULFATE 75 MG: 75 TABLET ORAL at 03:10

## 2023-10-09 RX ADMIN — DEXAMETHASONE SODIUM PHOSPHATE 8 MG: 4 INJECTION INTRA-ARTICULAR; INTRALESIONAL; INTRAMUSCULAR; INTRAVENOUS; SOFT TISSUE at 10:10

## 2023-10-09 RX ADMIN — SENNOSIDES AND DOCUSATE SODIUM 1 TABLET: 50; 8.6 TABLET ORAL at 09:10

## 2023-10-09 NOTE — ASSESSMENT & PLAN NOTE
History of papilledema, on regular follow-ups with outpatient neuro-ophtalmology. At the moment with post-op pain (venous sinus stenting)     - PRN pain meds.

## 2023-10-09 NOTE — H&P
[facility]  Neurocritical Care  History & Physical    Admit Date: 10/9/2023  Service Date: 10/09/2023  Length of Stay: 0    Subjective:     Chief Complaint: Chronic cerebral venous sinus thrombosis    History of Present Illness: Ms. Malinda Brady is a 23 y.o. patient with past medical history of TALON (in process for CPAP), and papilledema. Presented to [facility] ED with 1-month history of unilateral migraines. Patient states migraines are exacerbated during menstrual cycle on moderate to severe intensity, worsen with activity, pounding in quality and associated with sensitivity to light and sound. As per patient, 4 days after the onset of migraines, she started developing sudden bilateral vision loss without ocular pain. CT head on 8/22 with borderline enlargement of the sella turcica with a partially empty sella, suggestive of chronic intracranial hypertension. With Neuro-Ophtalmology follow ups for papilledema. Patient was then followed by Neurology, who started patient on topamax, frovatriptan and compazine and recommended MRI/MRV (9/6/23) showing similar findings of previous CTH. MRV with suspect bilateral transverse/sigmoid sinus stenosis. Vascular Neurology saw patient in clinic on 9/26/23 and recommended DAPT and endovascular management.     The patient will be admitted to Neuro ICU for close monitoring and a higher level of care.         Past Medical History:   Diagnosis Date    Acne     Menarche     Age of onset 10     Past Surgical History:   Procedure Laterality Date    TONSILLECTOMY      Tonsils        No current facility-administered medications on file prior to encounter.     Current Outpatient Medications on File Prior to Encounter   Medication Sig Dispense Refill    clopidogreL (PLAVIX) 75 mg tablet Take 1 tablet (75 mg total) by mouth once daily. 30 tablet 11    topiramate (TOPAMAX) 50 MG tablet Take 1 tablet (50 mg total) by mouth 2 (two) times daily. (Patient taking  differently: Take 50 mg by mouth nightly.) 60 tablet 11    ondansetron (ZOFRAN-ODT) 4 MG TbDL Take 4 mg by mouth every 8 (eight) hours as needed.      prochlorperazine (COMPAZINE) 10 MG tablet Take 1 tablet (10 mg total) by mouth every 6 (six) hours as needed (migraine or nausea). 60 tablet 11    rizatriptan (MAXALT) 10 MG tablet 1 tab PO PRN migraine. May repeat every 2 hours for max 3 tabs in 24 hours. Use no more than 10 days per month. 9 tablet 11    tiZANidine (ZANAFLEX) 4 MG tablet Half or full tablet by mouth at night as needed for muscle spasm 30 tablet 11      Allergies: Patient has no known allergies.    Family History   Problem Relation Age of Onset    Acne Brother     Breast cancer Paternal Grandmother     Migraines Father     Arthritis Mother     Migraines Sister     No Known Problems Sister     Colon cancer Neg Hx     Diabetes Neg Hx     Hypertension Neg Hx     Ovarian cancer Neg Hx     Melanoma Neg Hx     Lupus Neg Hx     Psoriasis Neg Hx     Eczema Neg Hx     Cancer Neg Hx      Social History     Tobacco Use    Smoking status: Never    Smokeless tobacco: Never   Substance Use Topics    Alcohol use: No    Drug use: No     Review of Systems   Constitutional:  Positive for fatigue. Negative for activity change and appetite change.   HENT:  Negative for tinnitus and trouble swallowing.    Eyes:  Positive for photophobia and visual disturbance.   Respiratory:  Negative for apnea, cough and shortness of breath.    Cardiovascular:  Negative for chest pain and leg swelling.   Gastrointestinal:  Negative for abdominal pain and diarrhea.   Endocrine: Negative for cold intolerance and heat intolerance.   Genitourinary:  Negative for dysuria and hematuria.   Musculoskeletal:  Negative for arthralgias and myalgias.   Skin:  Negative for pallor and rash.   Neurological:  Positive for headaches. Negative for tremors, seizures and numbness.   Psychiatric/Behavioral:  Negative for agitation and confusion.       Objective:     Vitals:    Temp: 97.1 °F (36.2 °C)  Pulse: 74  Rhythm: normal sinus rhythm  BP: 125/78  MAP (mmHg): 97  Resp: 18  SpO2: 100 %    Temp  Min: 97.1 °F (36.2 °C)  Max: 98.8 °F (37.1 °C)  Pulse  Min: 70  Max: 116  BP  Min: 114/77  Max: 156/97  MAP (mmHg)  Min: 90  Max: 119  Resp  Min: 12  Max: 23  SpO2  Min: 97 %  Max: 100 %    No intake/output data recorded.            Physical Exam  Vitals reviewed.   Eyes:      Pupils: Pupils are equal, round, and reactive to light.   Cardiovascular:      Rate and Rhythm: Normal rate and regular rhythm.   Pulmonary:      Effort: Pulmonary effort is normal.      Breath sounds: Normal breath sounds.   Abdominal:      Palpations: Abdomen is soft.   Skin:     General: Skin is warm.      Capillary Refill: Capillary refill takes 2 to 3 seconds.   Neurological:      Mental Status: She is alert and oriented to person, place, and time.      Motor: Weakness (post-op weakness.) present.      Coordination: Coordination normal.      Deep Tendon Reflexes: Reflexes normal.            Today I personally reviewed pertinent medications, imaging, and laboratory results.      Assessment/Plan:     Neuro  * Dural Venous Sinus Stenosis  Patient with PMHx of TALON and papilledema with 1-month history of migraines with bilateral loss of vision without ocular pain. Followed as an outpatient with neuro-ophtalmology, and vascular neurology who recommended following findings on MRA suggesting bilateral venous sinus stenosis, that the patient could benefit from endovascular management. Status post left transverse sinus stenosis with stenting (10/9/23)    Plan   - NS at 75cc/h   - aspirin 325mg daily  - plavix 75mg daily   -  F/u PT/INR, PTT   - labetalol 10mg IV Q4H  PRN if SBP > 140   - on mechanical VTE prophylaxis  - PT/OT evaluation   - PRN antiemetics      Migraine without aura and without status migrainosus, not intractable  History of menstrual migraines. Headaches are typically unilateral,  moderate to severe in intensity, worsen with activity, pounding in quality and associated with sensitivity to light and sound.     - Hold migraine anti-migraine meds following procedure  - regular neurocheks   - PRN pain meds     IIH (idiopathic intracranial hypertension)  Pseudotumor cerebri with papilledema with multiple episodes of migraines and vision loss.           Ophtho  Papilledema  History of papilledema, on regular follow-ups with outpatient neuro-ophtalmology. At the moment with post-op pain (venous sinus stenting)     - PRN pain meds.     Endocrine  Morbid obesity with BMI of 40.0-44.9, adult  In the process of getting CPAP at home.           The patient is being Prophylaxed for:  Venous Thromboembolism with: Mechanical  Stress Ulcer with: None  Ventilator Pneumonia with: none    Activity Orders            Progressive Mobility Protocol (mobilize patient to their highest level of functioning at least twice daily) starting at 2000    Turn patient starting at 10/09 1400    Elevate HOB starting at 10/09 1254    Diet NPO: NPO starting at 10/09 1254          Full Code    Dylon Nguyen MD  Neurocritical Care  [facility]

## 2023-10-09 NOTE — PLAN OF CARE
Pt arrived to IR rm 4 for intracranial venous sinus stent. Pt oriented to unit and staff. Plan of care reviewed with patient, patient verbalizes understanding. Comfort measures utilized. Pt safely transferred from stretcher to procedural table. Fall risk reviewed with patient, fall risk interventions maintained. Safety strap applied, positioner pillows utilized to minimize pressure points. Blankets applied. Pt prepped and draped utilizing standard sterile technique. Patient placed on continuous monitoring, as required by sedation policy. Timeouts completed utilizing standard universal time-out, per department and facility policy. RN to remain at bedside, continuous monitoring maintained. Pt resting comfortably. Denies pain/discomfort. Will continue to monitor. See flow sheets for monitoring, medication administration, and updates.

## 2023-10-09 NOTE — PLAN OF CARE
Procedure complete. Pt tolerated well. Hemostasis time 1120, flat for 2 hours, until 1320 .Site CDI. Pt transferred to [place] [address] and report to be given bedside. To stay overnight under observation.

## 2023-10-09 NOTE — SUBJECTIVE & OBJECTIVE
Past Medical History:   Diagnosis Date    Acne     Menarche     Age of onset 10     Past Surgical History:   Procedure Laterality Date    TONSILLECTOMY      Tonsils        No current facility-administered medications on file prior to encounter.     Current Outpatient Medications on File Prior to Encounter   Medication Sig Dispense Refill    clopidogreL (PLAVIX) 75 mg tablet Take 1 tablet (75 mg total) by mouth once daily. 30 tablet 11    topiramate (TOPAMAX) 50 MG tablet Take 1 tablet (50 mg total) by mouth 2 (two) times daily. (Patient taking differently: Take 50 mg by mouth nightly.) 60 tablet 11    ondansetron (ZOFRAN-ODT) 4 MG TbDL Take 4 mg by mouth every 8 (eight) hours as needed.      prochlorperazine (COMPAZINE) 10 MG tablet Take 1 tablet (10 mg total) by mouth every 6 (six) hours as needed (migraine or nausea). 60 tablet 11    rizatriptan (MAXALT) 10 MG tablet 1 tab PO PRN migraine. May repeat every 2 hours for max 3 tabs in 24 hours. Use no more than 10 days per month. 9 tablet 11    tiZANidine (ZANAFLEX) 4 MG tablet Half or full tablet by mouth at night as needed for muscle spasm 30 tablet 11      Allergies: Patient has no known allergies.    Family History   Problem Relation Age of Onset    Acne Brother     Breast cancer Paternal Grandmother     Migraines Father     Arthritis Mother     Migraines Sister     No Known Problems Sister     Colon cancer Neg Hx     Diabetes Neg Hx     Hypertension Neg Hx     Ovarian cancer Neg Hx     Melanoma Neg Hx     Lupus Neg Hx     Psoriasis Neg Hx     Eczema Neg Hx     Cancer Neg Hx      Social History     Tobacco Use    Smoking status: Never    Smokeless tobacco: Never   Substance Use Topics    Alcohol use: No    Drug use: No     Review of Systems   Constitutional:  Positive for fatigue. Negative for activity change and appetite change.   HENT:  Negative for tinnitus and trouble swallowing.    Eyes:  Positive for photophobia and visual disturbance.   Respiratory:   Negative for apnea, cough and shortness of breath.    Cardiovascular:  Negative for chest pain and leg swelling.   Gastrointestinal:  Negative for abdominal pain and diarrhea.   Endocrine: Negative for cold intolerance and heat intolerance.   Genitourinary:  Negative for dysuria and hematuria.   Musculoskeletal:  Negative for arthralgias and myalgias.   Skin:  Negative for pallor and rash.   Neurological:  Positive for headaches. Negative for tremors, seizures and numbness.   Psychiatric/Behavioral:  Negative for agitation and confusion.      Objective:     Vitals:    Temp: 97.1 °F (36.2 °C)  Pulse: 74  Rhythm: normal sinus rhythm  BP: 125/78  MAP (mmHg): 97  Resp: 18  SpO2: 100 %    Temp  Min: 97.1 °F (36.2 °C)  Max: 98.8 °F (37.1 °C)  Pulse  Min: 70  Max: 116  BP  Min: 114/77  Max: 156/97  MAP (mmHg)  Min: 90  Max: 119  Resp  Min: 12  Max: 23  SpO2  Min: 97 %  Max: 100 %    No intake/output data recorded.            Physical Exam  Vitals reviewed.   Eyes:      Pupils: Pupils are equal, round, and reactive to light.   Cardiovascular:      Rate and Rhythm: Normal rate and regular rhythm.   Pulmonary:      Effort: Pulmonary effort is normal.      Breath sounds: Normal breath sounds.   Abdominal:      Palpations: Abdomen is soft.   Skin:     General: Skin is warm.      Capillary Refill: Capillary refill takes 2 to 3 seconds.   Neurological:      Mental Status: She is alert and oriented to person, place, and time.      Motor: Weakness (post-op weakness.) present.      Coordination: Coordination normal.      Deep Tendon Reflexes: Reflexes normal.            Today I personally reviewed pertinent medications, imaging, and laboratory results.

## 2023-10-09 NOTE — HPI
Ms. Malinda Brady is a 23 y.o. patient with past medical history of TALON (in process for CPAP), and papilledema. Presented to [facility] ED with 1-month history of unilateral migraines. Patient states migraines are exacerbated during menstrual cycle on moderate to severe intensity, worsen with activity, pounding in quality and associated with sensitivity to light and sound. As per patient, 4 days after the onset of migraines, she started developing sudden bilateral vision loss without ocular pain. CT head on 8/22 with borderline enlargement of the sella turcica with a partially empty sella, suggestive of chronic intracranial hypertension. With Neuro-Ophtalmology follow ups for papilledema. Patient was then followed by Neurology, who started patient on topamax, frovatriptan and compazine and recommended MRI/MRV (9/6/23) showing similar findings of previous CTH. MRA with suspect bilateral transverse/sigmoid sinus stenosis. Vascular Neurology saw patient in clinic in 9/26/23 and recommended DAPT and endovascular management.     The patient will be admitted to Neuro ICU for close monitoring and a higher level of care.

## 2023-10-09 NOTE — ASSESSMENT & PLAN NOTE
History of menstrual migraines. Headaches are typically unilateral, moderate to severe in intensity, worsen with activity, pounding in quality and associated with sensitivity to light and sound.     - Hold migraine anti-migraine meds following procedure  - regular neurocheks   - PRN pain meds

## 2023-10-09 NOTE — PLAN OF CARE
Chart reviewed. Preop nursing care completed per orders. Safe surgery checklist complete aside from H&P update and procedure consent. Pt's groin clipped bilaterally. Neuro assessment complete. Family at bedside and to take belongings. Call bell within reach. Instructed pt to call for assistance.

## 2023-10-09 NOTE — ANESTHESIA PREPROCEDURE EVALUATION
10/09/2023  Malinda Brady is a 23 y.o., female with idiopathic intracranial hypertension here for intracranial stenting.        Patient Active Problem List   Diagnosis    Acne    Morbid obesity with BMI of 40.0-44.9, adult    Migraine without aura and without status migrainosus, not intractable    Visual disturbance    Elevated blood pressure reading in office without diagnosis of hypertension    IIH (idiopathic intracranial hypertension)       Review of patient's allergies indicates:  No Known Allergies    Current Outpatient Medications on File Prior to Encounter   Medication Sig Dispense Refill    clopidogreL (PLAVIX) 75 mg tablet Take 1 tablet (75 mg total) by mouth once daily. 30 tablet 11    topiramate (TOPAMAX) 50 MG tablet Take 1 tablet (50 mg total) by mouth 2 (two) times daily. (Patient taking differently: Take 50 mg by mouth nightly.) 60 tablet 11    ondansetron (ZOFRAN-ODT) 4 MG TbDL Take 4 mg by mouth every 8 (eight) hours as needed.      prochlorperazine (COMPAZINE) 10 MG tablet Take 1 tablet (10 mg total) by mouth every 6 (six) hours as needed (migraine or nausea). 60 tablet 11    rizatriptan (MAXALT) 10 MG tablet 1 tab PO PRN migraine. May repeat every 2 hours for max 3 tabs in 24 hours. Use no more than 10 days per month. 9 tablet 11    tiZANidine (ZANAFLEX) 4 MG tablet Half or full tablet by mouth at night as needed for muscle spasm 30 tablet 11     No current facility-administered medications on file prior to encounter.       Past Surgical History:   Procedure Laterality Date    TONSILLECTOMY      Tonsils         Social History     Socioeconomic History    Marital status: Single   Occupational History    Occupation: Student   Tobacco Use    Smoking status: Never    Smokeless tobacco: Never   Substance and Sexual Activity    Alcohol use: No    Drug use: No    Sexual  "activity: Yes     Birth control/protection: Condom   Other Topics Concern    Are you pregnant or think you may be? No    Breast-feeding No   Social History Narrative    Lives in a dorm room at school and comes home to both parents during her breaks    2 dog     3 siblings    1st year at [employer]         CBC: No results for input(s): "WBC", "RBC", "HGB", "HCT", "PLT", "MCV", "MCH", "MCHC" in the last 72 hours.    CMP: No results for input(s): "NA", "K", "CL", "CO2", "BUN", "CREATININE", "GLU", "MG", "PHOS", "CALCIUM", "ALBUMIN", "PROT", "ALKPHOS", "ALT", "AST", "BILITOT" in the last 72 hours.    INR  No results for input(s): "PT", "INR", "PROTIME", "APTT" in the last 72 hours.          2D Echo:  No results found for this or any previous visit.        Pre-op Assessment    I have reviewed the Patient Summary Reports.     I have reviewed the Nursing Notes.    I have reviewed the Medications.     Review of Systems  Anesthesia Hx:  No problems with previous Anesthesia    Hematology/Oncology:  Hematology Normal   Oncology Normal     EENT/Dental:EENT/Dental Normal   Cardiovascular:  Cardiovascular Normal     Pulmonary:   Sleep Apnea    Renal/:  Renal/ Normal     Hepatic/GI:  Hepatic/GI Normal    Musculoskeletal:  Musculoskeletal Normal    Neurological:   Headaches    Endocrine:  Endocrine Normal    Dermatological:  Skin Normal    Psych:  Psychiatric Normal           Physical Exam  General: Well nourished    Airway:  Mallampati: III   Mouth Opening: Normal  TM Distance: Normal  Tongue: Normal  Neck ROM: Normal ROM    Dental:  Intact    Chest/Lungs:  Clear to auscultation, Normal Respiratory Rate    Heart:  Rate: Normal  Rhythm: Regular Rhythm  Sounds: Normal        Anesthesia Plan  Type of Anesthesia, risks & benefits discussed:    Anesthesia Type: Gen ETT, Gen Natural Airway  Intra-op Monitoring Plan: Standard ASA Monitors  Post Op Pain Control Plan: multimodal analgesia and IV/PO Opioids PRN  Induction:  " IV  Airway Plan: Direct, Post-Induction  Informed Consent: Informed consent signed with the Patient and all parties understand the risks and agree with anesthesia plan.  All questions answered. Patient consented to blood products? Yes  ASA Score: 3  Day of Surgery Review of History & Physical: H&P Update referred to the surgeon/provider.  Anesthesia Plan Notes: Discussed plan for General endotracheal anesthesia    Ready For Surgery From Anesthesia Perspective.     .

## 2023-10-09 NOTE — ASSESSMENT & PLAN NOTE
Patient with PMHx of TALON and papilledema with 1-month history of migraines with bilateral loss of vision without ocular pain. Followed as an outpatient with neuro-ophtalmology, and vascular neurology who recommended following findings on MRA suggesting bilateral venous sinus stenosis, that the patient could benefit from endovascular management. Status post left transverse sinus stenosis with stenting (10/9/23)    Plan   - NS at 75cc/h   - aspirin 325mg daily  - plavix 75mg daily   -  F/u PT/INR, PTT   - labetalol 10mg IV Q4H  PRN if SBP > 140   - on mechanical VTE prophylaxis  - PT/OT evaluation   - PRN antiemetics

## 2023-10-09 NOTE — PROCEDURES
Radiology Post-Procedure Note    Pre Op Diagnosis: Vision loss, venous sinus stenosis, IIH    Post Op Diagnosis: same    Procedure: Cerebral angiogram, venogram and venous sinus stenting    Procedure performed by: Tha Haynes MD    Written Informed Consent Obtained: Yes    Specimen Removed: NO    Estimated Blood Loss: Minimal    Procedure report:     A 5F sheath was placed into the left femoral artery and a 5F Reinaldo catheter was advanced into the aortic arch.  The bilatgeral common, internal, external carotids and bilateral vertebral arteries were subselected and angiography of the brain was performed after injection into each of these vessels.    8F sheath was placed in the right femoral vein and trackstar was advanced to the L transverse sinus.    Preliminary interpretation: L Transverse sinus stenosis treated w stenting.  Please see Imaging report for full details.    A right femoral vein was imaged, the sheath removed and hemostasis achieved using manual pressure, L femoral artery w angioseal.  No hematoma was present at the time of hemostasis.    The patient tolerated the procedure well.         Tha Haynes MD  Vascular and Interventional Neurology  , Department of Neurology  Section Head, Vascular Neurology  Departments of Neurology, Neurosurgery and Radiology  [facility] [place], [place]

## 2023-10-09 NOTE — H&P
"[facility]  Radiology Department  H&P      Reason for Consult (Chief Complaint): Vision loss    SUBJECTIVE:     History of Present Illness:  Patient is a 23 y.o. female who presents to hospital today for angio/venogram and venous sinus stenting to treat vision loss.    ASA: 1  Mall: 3    Past Medical History:   Diagnosis Date    Acne     Menarche     Age of onset 10     Past Surgical History:   Procedure Laterality Date    TONSILLECTOMY      Tonsils       Family History   Problem Relation Age of Onset    Acne Brother     Breast cancer Paternal Grandmother     Migraines Father     Arthritis Mother     Migraines Sister     No Known Problems Sister     Colon cancer Neg Hx     Diabetes Neg Hx     Hypertension Neg Hx     Ovarian cancer Neg Hx     Melanoma Neg Hx     Lupus Neg Hx     Psoriasis Neg Hx     Eczema Neg Hx     Cancer Neg Hx      Social History     Tobacco Use    Smoking status: Never    Smokeless tobacco: Never   Substance Use Topics    Alcohol use: No    Drug use: No     Review of patient's allergies indicates:  No Known Allergies    OBJECTIVE:     Vital Signs (Most Recent):  /77 (BP Location: Right arm, Patient Position: Lying)   Pulse 72   Temp 98.8 °F (37.1 °C) (Temporal)   Resp 18   Ht 5' 7" (1.702 m)   Wt 125.6 kg (277 lb)   LMP 09/14/2023 (Exact Date)   SpO2 97%   Breastfeeding No   BMI 43.38 kg/m²     Physical Exam:  Alert, speaking clearly.  Moves all extremities.  Papilledema    Laboratory:  BMP:   Lab Results   Component Value Date     09/22/2023    K 4.3 09/22/2023     09/22/2023    CO2 26 09/22/2023    BUN 8 09/22/2023    CREATININE 1.0 09/22/2023    CALCIUM 9.4 09/22/2023     CBC:   Lab Results   Component Value Date    WBC 11.15 10/05/2023    RBC 5.36 10/05/2023    HGB 16.2 (H) 10/05/2023    HCT 48.1 10/05/2023     10/05/2023    MCV 90 10/05/2023    MCH 30.2 10/05/2023    MCHC 33.7 10/05/2023     Lipid Panel:   Lab Results   Component " Value Date    CHOL 166 07/20/2015    LDLCALC 99.8 07/20/2015    HDL 30 (L) 07/20/2015    TRIG 181 (H) 07/20/2015     Coagulation:   Lab Results   Component Value Date    INR 0.9 10/05/2023     Hgb A1C:   Lab Results   Component Value Date    HGBA1C 4.7 09/22/2023     TSH:   Lab Results   Component Value Date    TSH 4.311 (H) 09/22/2023         ASSESSMENT/PLAN:     Diagnosis:  Vision loss  Idiopathic intracranial hypertension  Venous sinus stenosis    Recommendations:  Angio/venogram and stenting         Tha Haynes MD  Vascular and Interventional Neurology  , Department of Neurology  Section Head, Vascular Neurology  Departments of Neurology, Neurosurgery and Radiology  [facility] [place], [place]

## 2023-10-09 NOTE — TRANSFER OF CARE
"Anesthesia Transfer of Care Note    Patient: Malinda Brady    Procedure(s) Performed: * No procedures listed *    Patient location: PACU    Anesthesia Type: general    Transport from OR: Transported from OR on 6-10 L/min O2 by face mask with adequate spontaneous ventilation. Continuous ECG monitoring in transport. Continuous SpO2 monitoring in transport    Post pain: adequate analgesia    Post assessment: no apparent anesthetic complications and tolerated procedure well    Post vital signs: stable    Level of consciousness: awake    Nausea/Vomiting: no nausea/vomiting    Complications: none    Transfer of care protocol was followed      Last vitals:   Visit Vitals  /77 (BP Location: Right arm, Patient Position: Lying)   Pulse 72   Temp 37.1 °C (98.8 °F) (Temporal)   Resp 18   Ht 5' 7" (1.702 m)   Wt 125.6 kg (277 lb)   LMP 09/14/2023 (Exact Date)   SpO2 97%   Breastfeeding No   BMI 43.38 kg/m²     "

## 2023-10-09 NOTE — ANESTHESIA PROCEDURE NOTES
Intubation    Date/Time: 10/9/2023 10:14 AM    Performed by: Mary Jane Mejía CRNA  Authorized by: Scottie Morales MD    Intubation:     Induction:  Intravenous    Mask Ventilation:  Easy mask    Attempts:  1    Attempted By:  CRNA    Method of Intubation:  Video laryngoscopy    Blade:  Quesada 3    Laryngeal View Grade: Grade I - full view of cords      Difficult Airway Encountered?: No      Complications:  None    Airway Device:  Oral endotracheal tube    Airway Device Size:  7.5    Style/Cuff Inflation:  Cuffed    Inflation Amount (mL):  5    Tube secured:  22    Secured at:  The teeth    Placement Verified By:  Capnometry and Revisualization with laryngoscopy    Complicating Factors:  Overbite and obesity    Findings Post-Intubation:  BS equal bilateral and atraumatic/condition of teeth unchanged

## 2023-10-09 NOTE — PLAN OF CARE
"[facility] Care Plan    POC reviewed with Malinda Brady and family at 1400. Pt verbalized understanding. Questions and concerns addressed. No acute events today. Pt progressing toward goals. Will continue to monitor. See below and flowsheets for full assessment and VS info.     -Administered Tylenol x1 for HA.            Is this a stroke patient? no    Neuro:  Luis Coma Scale  Best Eye Response: 4-->(E4) spontaneous  Best Motor Response: 6-->(M6) obeys commands  Best Verbal Response: 5-->(V5) oriented  Luis Coma Scale Score: 15  Pupil PERRLA: yes     24 hr Temp:  [97.1 °F (36.2 °C)-98.8 °F (37.1 °C)]     CV:   Rhythm: normal sinus rhythm  BP goals:   SBP < 140  MAP > 65    Resp:           Plan: N/A    GI/:     Diet/Nutrition Received: regular  Last Bowel Movement: 10/08/23  Voiding Characteristics: external catheter    Intake/Output Summary (Last 24 hours) at 10/9/2023 1828  Last data filed at 10/9/2023 1822  Gross per 24 hour   Intake 640 ml   Output 900 ml   Net -260 ml          Labs/Accuchecks:  Recent Labs   Lab 10/05/23  1101   WBC 11.15   RBC 5.36   HGB 16.2*   HCT 48.1       No results for input(s): "NA", "K", "CO2", "CL", "BUN", "CREATININE", "ALKPHOS", "ALT", "AST", "BILITOT" in the last 168 hours.    Invalid input(s): "[id number]"   Recent Labs   Lab 10/05/23  1101   INR 0.9    No results for input(s): "CPK", "CPKMB", "TROPONINI", "MB" in the last 168 hours.    Electrolytes: No replacement orders  Accuchecks: none    Gtts:   sodium chloride 0.9%      sodium chloride 0.9%         LDA/Wounds:  Lines/Drains/Airways       Drain  Duration             Female External Urinary Catheter 10/09/23 1150 <1 day              Peripheral Intravenous Line  Duration                  Peripheral IV - Single Lumen 10/09/23 0916 20 G Posterior;Right Hand <1 day         Peripheral IV - Single Lumen 10/09/23 1026 20 G Left;Posterior Hand <1 day                  Wounds: No  Wound care consulted: No   "

## 2023-10-10 ENCOUNTER — PATIENT OUTREACH (OUTPATIENT)
Dept: ADMINISTRATIVE | Facility: HOSPITAL | Age: 23
End: 2023-10-10
Payer: COMMERCIAL

## 2023-10-10 ENCOUNTER — PATIENT MESSAGE (OUTPATIENT)
Dept: ADMINISTRATIVE | Facility: HOSPITAL | Age: 23
End: 2023-10-10
Payer: COMMERCIAL

## 2023-10-10 VITALS
OXYGEN SATURATION: 97 % | RESPIRATION RATE: 20 BRPM | TEMPERATURE: 98 F | DIASTOLIC BLOOD PRESSURE: 58 MMHG | HEIGHT: 67 IN | SYSTOLIC BLOOD PRESSURE: 123 MMHG | WEIGHT: 277 LBS | HEART RATE: 89 BPM | BODY MASS INDEX: 43.47 KG/M2

## 2023-10-10 LAB
ALBUMIN SERPL BCP-MCNC: 3.6 G/DL (ref 3.5–5.2)
ALP SERPL-CCNC: 56 U/L (ref 55–135)
ALT SERPL W/O P-5'-P-CCNC: 40 U/L (ref 10–44)
ANION GAP SERPL CALC-SCNC: 8 MMOL/L (ref 8–16)
APTT PPP: 24.2 SEC (ref 21–32)
AST SERPL-CCNC: 18 U/L (ref 10–40)
BASOPHILS # BLD AUTO: 0.02 K/UL (ref 0–0.2)
BASOPHILS NFR BLD: 0.1 % (ref 0–1.9)
BILIRUB SERPL-MCNC: 0.6 MG/DL (ref 0.1–1)
BUN SERPL-MCNC: 9 MG/DL (ref 6–20)
CALCIUM SERPL-MCNC: 9 MG/DL (ref 8.7–10.5)
CHLORIDE SERPL-SCNC: 111 MMOL/L (ref 95–110)
CO2 SERPL-SCNC: 17 MMOL/L (ref 23–29)
CREAT SERPL-MCNC: 0.8 MG/DL (ref 0.5–1.4)
DIFFERENTIAL METHOD: ABNORMAL
EOSINOPHIL # BLD AUTO: 0 K/UL (ref 0–0.5)
EOSINOPHIL NFR BLD: 0 % (ref 0–8)
ERYTHROCYTE [DISTWIDTH] IN BLOOD BY AUTOMATED COUNT: 12.4 % (ref 11.5–14.5)
EST. GFR  (NO RACE VARIABLE): >60 ML/MIN/1.73 M^2
GLUCOSE SERPL-MCNC: 125 MG/DL (ref 70–110)
HCT VFR BLD AUTO: 42.1 % (ref 37–48.5)
HGB BLD-MCNC: 15.2 G/DL (ref 12–16)
IMM GRANULOCYTES # BLD AUTO: 0.1 K/UL (ref 0–0.04)
IMM GRANULOCYTES NFR BLD AUTO: 0.7 % (ref 0–0.5)
INR PPP: 1 (ref 0.8–1.2)
LYMPHOCYTES # BLD AUTO: 1.8 K/UL (ref 1–4.8)
LYMPHOCYTES NFR BLD: 11.9 % (ref 18–48)
MAGNESIUM SERPL-MCNC: 1.9 MG/DL (ref 1.6–2.6)
MCH RBC QN AUTO: 30.9 PG (ref 27–31)
MCHC RBC AUTO-ENTMCNC: 36.1 G/DL (ref 32–36)
MCV RBC AUTO: 86 FL (ref 82–98)
MONOCYTES # BLD AUTO: 0.9 K/UL (ref 0.3–1)
MONOCYTES NFR BLD: 5.9 % (ref 4–15)
NEUTROPHILS # BLD AUTO: 12.4 K/UL (ref 1.8–7.7)
NEUTROPHILS NFR BLD: 81.4 % (ref 38–73)
NRBC BLD-RTO: 0 /100 WBC
PHOSPHATE SERPL-MCNC: 1.4 MG/DL (ref 2.7–4.5)
PLATELET # BLD AUTO: 249 K/UL (ref 150–450)
PMV BLD AUTO: 10.2 FL (ref 9.2–12.9)
POTASSIUM SERPL-SCNC: 4.1 MMOL/L (ref 3.5–5.1)
PROT SERPL-MCNC: 6.8 G/DL (ref 6–8.4)
PROTHROMBIN TIME: 10.8 SEC (ref 9–12.5)
RBC # BLD AUTO: 4.92 M/UL (ref 4–5.4)
SODIUM SERPL-SCNC: 136 MMOL/L (ref 136–145)
WBC # BLD AUTO: 15.18 K/UL (ref 3.9–12.7)

## 2023-10-10 PROCEDURE — 97530 THERAPEUTIC ACTIVITIES: CPT

## 2023-10-10 PROCEDURE — 99233 SBSQ HOSP IP/OBS HIGH 50: CPT | Mod: ,,, | Performed by: INTERNAL MEDICINE

## 2023-10-10 PROCEDURE — 85025 COMPLETE CBC W/AUTO DIFF WBC: CPT

## 2023-10-10 PROCEDURE — 80053 COMPREHEN METABOLIC PANEL: CPT

## 2023-10-10 PROCEDURE — 85610 PROTHROMBIN TIME: CPT

## 2023-10-10 PROCEDURE — 94761 N-INVAS EAR/PLS OXIMETRY MLT: CPT

## 2023-10-10 PROCEDURE — 92610 EVALUATE SWALLOWING FUNCTION: CPT

## 2023-10-10 PROCEDURE — 97162 PT EVAL MOD COMPLEX 30 MIN: CPT

## 2023-10-10 PROCEDURE — 97116 GAIT TRAINING THERAPY: CPT

## 2023-10-10 PROCEDURE — 83735 ASSAY OF MAGNESIUM: CPT

## 2023-10-10 PROCEDURE — 92523 SPEECH SOUND LANG COMPREHEN: CPT

## 2023-10-10 PROCEDURE — 99233 PR SUBSEQUENT HOSPITAL CARE,LEVL III: ICD-10-PCS | Mod: ,,, | Performed by: INTERNAL MEDICINE

## 2023-10-10 PROCEDURE — 84100 ASSAY OF PHOSPHORUS: CPT

## 2023-10-10 PROCEDURE — 97165 OT EVAL LOW COMPLEX 30 MIN: CPT

## 2023-10-10 PROCEDURE — 25000003 PHARM REV CODE 250

## 2023-10-10 PROCEDURE — 85730 THROMBOPLASTIN TIME PARTIAL: CPT

## 2023-10-10 RX ORDER — NAPROXEN SODIUM 220 MG/1
81 TABLET, FILM COATED ORAL DAILY
Qty: 30 TABLET | Refills: 11 | Status: SHIPPED | OUTPATIENT
Start: 2023-10-10 | End: 2024-10-09

## 2023-10-10 RX ADMIN — CLOPIDOGREL BISULFATE 75 MG: 75 TABLET ORAL at 08:10

## 2023-10-10 RX ADMIN — POTASSIUM & SODIUM PHOSPHATES POWDER PACK 280-160-250 MG 2 PACKET: 280-160-250 PACK at 08:10

## 2023-10-10 RX ADMIN — POTASSIUM & SODIUM PHOSPHATES POWDER PACK 280-160-250 MG 2 PACKET: 280-160-250 PACK at 04:10

## 2023-10-10 RX ADMIN — ACETAMINOPHEN 650 MG: 325 TABLET ORAL at 06:10

## 2023-10-10 RX ADMIN — ASPIRIN 325 MG ORAL TABLET 325 MG: 325 PILL ORAL at 08:10

## 2023-10-10 RX ADMIN — SENNOSIDES AND DOCUSATE SODIUM 1 TABLET: 50; 8.6 TABLET ORAL at 08:10

## 2023-10-10 NOTE — CONSULTS
Nutrition Care Consult    RD consulted to assess dietary needs. Noted discharge orders have been signed. Noted current PO intake of 100%. No issues with n/v/d/chewing/swallowing. -277# per chart review; #. No s/s of malnutrition at this time. LBM 10/8. Please contact RD if any additional nutrition concerns arise. RD to monitor and follow.     Thanks!  Bettye Alvarez RDN,LDN

## 2023-10-10 NOTE — CONSULTS
Inpatient consult to Physical Medicine Rehab  Consult performed by: Janeen Whittington NP  Consult ordered by: Dylon Nguyen MD      Consult received.  Reviewed patient history and current admission.  PM&R following. Will follow up with pt once medically stable and able to participate with therapies.    Janeen Whittington NP  Physical Medicine & Rehabilitation   10/10/2023

## 2023-10-10 NOTE — PLAN OF CARE
Problem: SLP  Goal: SLP Goal  Outcome: Met      Evaluation completed.  Rec regular diet with thin liquids with standard aspiration precautions. No further ST recommended at this time.

## 2023-10-10 NOTE — ASSESSMENT & PLAN NOTE
23F with TALON and papilledema with 1-month history of migraines with bilateral loss of vision without ocular pain. MRA suggesting bilateral venous sinus stenosis, now s/p left transverse sinus stenting (10/9/23)    - Discharged per Neuro IR  - Aspirin 325mg daily  - Plavix 75mg daily   - PT/OT evaluation   - PRN antiemetics

## 2023-10-10 NOTE — PLAN OF CARE
Discharged from OT  Problem: Occupational Therapy  Goal: Occupational Therapy Goal  Description: Goals not set 2* no further OT needed.  Outcome: Met

## 2023-10-10 NOTE — PLAN OF CARE
"[facility] Care Plan    POC reviewed with Malinda Brady and family at 0300. Pt verbalized understanding. Questions and concerns addressed. No acute events overnight. Pt progressing toward goals. Will continue to monitor. See below and flowsheets for full assessment and VS info.     Tylenol x 1 for HA  Phosphorous replaced      Is this a stroke patient? no    Neuro:  Madison Coma Scale  Best Eye Response: 4-->(E4) spontaneous  Best Motor Response: 6-->(M6) obeys commands  Best Verbal Response: 5-->(V5) oriented  Madison Coma Scale Score: 15  Pupil PERRLA: yes     24hr Temp:  [97.1 °F (36.2 °C)-98.8 °F (37.1 °C)]     CV:   Rhythm: normal sinus rhythm  BP goals:   SBP < 140  MAP > 65    Resp:           Plan: N/A    GI/:     Diet/Nutrition Received: regular  Last Bowel Movement: 10/08/23  Voiding Characteristics: external catheter    Intake/Output Summary (Last 24 hours) at 10/10/2023 0338  Last data filed at 10/9/2023 1954  Gross per 24 hour   Intake 840 ml   Output 1500 ml   Net -660 ml          Labs/Accuchecks:  Recent Labs   Lab 10/10/23  0026   WBC 15.18*   RBC 4.92   HGB 15.2   HCT 42.1         Recent Labs   Lab 10/10/23  0026      K 4.1   CO2 17*   *   BUN 9   CREATININE 0.8   ALKPHOS 56   ALT 40   AST 18   BILITOT 0.6      Recent Labs   Lab 10/10/23  0026   INR 1.0   APTT 24.2    No results for input(s): "CPK", "CPKMB", "TROPONINI", "MB" in the last 168 hours.    Electrolytes: Electrolytes replaced  Accuchecks: none    Gtts:   sodium chloride 0.9%      sodium chloride 0.9%         LDA/Wounds:  Lines/Drains/Airways       Peripheral Intravenous Line  Duration                  Peripheral IV - Single Lumen 10/09/23 0916 20 G Posterior;Right Hand <1 day         Peripheral IV - Single Lumen 10/09/23 1026 20 G Left;Posterior Hand <1 day                  Wounds: No  Wound care consulted: No       Problem: Adult Inpatient Plan of Care  Goal: Plan of Care Review  Outcome: Ongoing, Progressing  Goal: " Absence of Hospital-Acquired Illness or Injury  Outcome: Ongoing, Progressing  Goal: Optimal Comfort and Wellbeing  Outcome: Ongoing, Progressing     Problem: Bariatric Environmental Safety  Goal: Safety Maintained with Care  Outcome: Ongoing, Progressing     Problem: Cognitive Impairment (Stroke, Hemorrhagic)  Goal: Optimal Cognitive Function  Outcome: Ongoing, Progressing     Problem: Functional Ability Impaired (Stroke, Hemorrhagic)  Goal: Optimal Functional Ability  Outcome: Ongoing, Progressing     Problem: Pain (Stroke, Hemorrhagic)  Goal: Acceptable Pain Control  Outcome: Ongoing, Progressing

## 2023-10-10 NOTE — PT/OT/SLP EVAL
"Occupational Therapy   Evaluation and Discharge Note    Name: Malinda Brady  MRN: [id number]  Admitting Diagnosis: Chronic cerebral venous sinus thrombosis  Recent Surgery: * No procedures listed * 1 Day Post-Op    Recommendations:     Discharge Recommendations: home  Discharge Equipment Recommendations: none  Barriers to discharge:  None    Assessment:     Malinda Brady is a 23 y.o. female with a medical diagnosis of Chronic cerebral venous sinus thrombosis. At this time, patient is functioning at their prior level of function and does not require further acute OT services.     Plan:     During this hospitalization, patient does not require further acute OT services.  Please re-consult if situation changes.    Plan of Care Reviewed with: patient    Subjective   Patient:  "I am doing well."    Occupational Profile:  Patient resides in [place] with fiance in 2nd floor apt with flight of stairs.  Patient is right handed.  PTA patient independent with ADLs including driving.  Works: customer service.  Hobbies: video games, walking the dog.  Currently owns no DME.     Pain/Comfort:  Pain Rating 1: 0/10  Pain Rating Post-Intervention 1: 0/10    Patients cultural, spiritual, Orthodoxy conflicts given the current situation: no    Objective:     Communicated with: Nurse prior to session.  Patient found supine with bed alarm, blood pressure cuff, telemetry, SCD, pulse ox (continuous), peripheral IV upon OT entry to room.    General Precautions: Standard, aspiration, fall  Orthopedic Precautions: N/A  Braces: N/A  Respiratory Status: Room air     Occupational Performance:    Bed Mobility:    Patient completed Rolling/Turning to Left with  modified independence  Patient completed Rolling/Turning to Right with modified independence  Patient completed Supine to Sit with modified independence  Patient completed Sit to Supine with modified independence    Functional Mobility/Transfers:  Patient completed Sit <> Stand Transfer " with modified independence  with  no assistive device   Patient completed Bed <> Chair Transfer using Stand Pivot technique with modified independence with no assistive device    Activities of Daily Living:  Grooming: modified independence    Upper Body Dressing: modified independence    Lower Body Dressing: modified independence      Cognitive/Visual Perceptual:  Cognitive/Psychosocial Skills:     -       Oriented to: Person, Place, Time, and Situation   -       Follows Commands/attention:Follows one-step commands  -       Communication: clear/fluent    Physical Exam:  Postural examination/scapula alignment:    -       Rounded shoulders  Upper Extremity Range of Motion:     -       Right Upper Extremity: WNL  -       Left Upper Extremity: WNL  Upper Extremity Strength:    -       Right Upper Extremity: WNL  -       Left Upper Extremity: WNL    AMPAC 6 Click ADL:  AMPAC Total Score: 24    Treatment & Education:  Patient alert and oriented x 3; able to follow 4/4 one step commands.  Patient attentive and interactive throughout the session.        Patient left supine with all lines intact, call button in reach, and bed alarm on    GOALS:   Multidisciplinary Problems       Occupational Therapy Goals       Not on file              Multidisciplinary Problems (Resolved)          Problem: Occupational Therapy    Goal Priority Disciplines Outcome Interventions   Occupational Therapy Goal   (Resolved)     OT, PT/OT Met    Description: Goals not set 2* no further OT needed.                       History:     Past Medical History:   Diagnosis Date    Acne     Menarche     Age of onset 10         Past Surgical History:   Procedure Laterality Date    TONSILLECTOMY      Tonsils         Time Tracking:     OT Date of Treatment: 10/10/23  OT Start Time: 0420  OT Stop Time: 0433  OT Total Time (min): 13 min    Billable Minutes:Evaluation 5  Therapeutic Activity 8    10/10/2023

## 2023-10-10 NOTE — PROGRESS NOTES
[facility]  Neurocritical Care  Progress Note    Admit Date: 10/9/2023  Service Date: 10/10/2023  Length of Stay: 1    Subjective:     Chief Complaint: Chronic cerebral venous sinus thrombosis    History of Present Illness: Ms. Malinda Brady is a 23 y.o. patient with past medical history of TALON (in process for CPAP), and papilledema. Presented to [facility] ED with 1-month history of unilateral migraines. Patient states migraines are exacerbated during menstrual cycle on moderate to severe intensity, worsen with activity, pounding in quality and associated with sensitivity to light and sound. As per patient, 4 days after the onset of migraines, she started developing sudden bilateral vision loss without ocular pain. CT head on 8/22 with borderline enlargement of the sella turcica with a partially empty sella, suggestive of chronic intracranial hypertension. With Neuro-Ophtalmology follow ups for papilledema. Patient was then followed by Neurology, who started patient on topamax, frovatriptan and compazine and recommended MRI/MRV (9/6/23) showing similar findings of previous CTH. MRA with suspect bilateral transverse/sigmoid sinus stenosis. Vascular Neurology saw patient in clinic in 9/26/23 and recommended DAPT and endovascular management.     The patient will be admitted to Neuro ICU for close monitoring and a higher level of care.         Hospital Course: 10/10/2023 NAEON. VSS. Exam stable. Pain well controlled. Discharged on , Plavix 75 per Neuro IR.      Interval History: As above.    Review of Systems   Constitutional:  Positive for fatigue. Negative for activity change and appetite change.   HENT:  Negative for tinnitus and trouble swallowing.    Eyes:  Positive for photophobia and visual disturbance.   Respiratory:  Negative for apnea, cough and shortness of breath.    Cardiovascular:  Negative for chest pain and leg swelling.   Gastrointestinal:  Negative for abdominal  pain and diarrhea.   Endocrine: Negative for cold intolerance and heat intolerance.   Genitourinary:  Negative for dysuria and hematuria.   Musculoskeletal:  Negative for arthralgias and myalgias.   Skin:  Negative for pallor and rash.   Neurological:  Positive for headaches. Negative for tremors, seizures and numbness.   Psychiatric/Behavioral:  Negative for agitation and confusion.      Objective:     Vitals:    Temp: 98.4 °F (36.9 °C)  Pulse: 71  Rhythm: normal sinus rhythm  BP: 121/60  MAP (mmHg): 84  Resp: 19  SpO2: 97 %    Temp  Min: 97.1 °F (36.2 °C)  Max: 98.8 °F (37.1 °C)  Pulse  Min: 64  Max: 116  BP  Min: 112/55  Max: 156/97  MAP (mmHg)  Min: 77  Max: 119  Resp  Min: 12  Max: 24  SpO2  Min: 94 %  Max: 100 %    10/09 0701 - 10/10 0700  In: 1340 [P.O.:940]  Out: 1500 [Urine:1500]            Physical Exam  Vitals reviewed.   HENT:      Head: Normocephalic.   Eyes:      Extraocular Movements: Extraocular movements intact.      Pupils: Pupils are equal, round, and reactive to light.   Cardiovascular:      Rate and Rhythm: Normal rate and regular rhythm.      Pulses: Normal pulses.   Pulmonary:      Effort: Pulmonary effort is normal.      Breath sounds: Normal breath sounds.   Abdominal:      General: There is no distension.      Palpations: Abdomen is soft.      Tenderness: There is no abdominal tenderness.   Musculoskeletal:         General: No swelling. Normal range of motion.      Cervical back: Normal range of motion. No rigidity.   Skin:     General: Skin is warm.      Capillary Refill: Capillary refill takes 2 to 3 seconds.   Neurological:      General: No focal deficit present.      Mental Status: She is alert and oriented to person, place, and time. Mental status is at baseline.      Cranial Nerves: No cranial nerve deficit.      Sensory: No sensory deficit.      Motor: No weakness.      Coordination: Coordination normal.      Gait: Gait normal.        Today I personally reviewed pertinent  medications, imaging, and laboratory results.    Estimated Creatinine Clearance: 150.6 mL/min (based on SCr of 0.8 mg/dL).      Assessment/Plan:     Neuro  * Dural Venous Sinus Stenosis  23F with TALON and papilledema with 1-month history of migraines with bilateral loss of vision without ocular pain. MRA suggesting bilateral venous sinus stenosis, now s/p left transverse sinus stenting (10/9/23)    - Discharged per Neuro IR  - Aspirin 325mg daily  - Plavix 75mg daily   - PT/OT evaluation   - PRN antiemetics    IIH (idiopathic intracranial hypertension)  Pseudotumor cerebri with papilledema with multiple episodes of migraines and vision loss.     Migraine without aura and without status migrainosus, not intractable  History of menstrual migraines. Headaches are typically unilateral, moderate to severe in intensity, worsen with activity, pounding in quality and associated with sensitivity to light and sound.     - Hold migraine anti-migraine meds following procedure  - Regular neurocheks   - PRN pain meds     Ophtho  Papilledema  History of papilledema, on regular follow-ups with outpatient neuro-ophtalmology  POD1 stenting of L transverse sinus  PRN pain meds.     Endocrine  Morbid obesity with BMI of 40.0-44.9, adult  In the process of getting CPAP at home.           The patient is being Prophylaxed for:  Venous Thromboembolism with: Not Applicable   Stress Ulcer with: Not Applicable   Ventilator Pneumonia with: not applicable    Activity Orders          Progressive Mobility Protocol (mobilize patient to their highest level of functioning at least twice daily) starting at 2000    Diet Adult Regular (IDDSI Level 7) Consistent Carbohydrate: Regular starting at 10/09 1603    Turn patient starting at 10/09 1400    Elevate HOB starting at 10/09 1254        Full Code    Devan Samuel MD  Neurocritical Care  [facility]

## 2023-10-10 NOTE — PLAN OF CARE
[facility] Neuro [facility]  Initial Discharge Assessment       Primary Care Provider: Dawood Ryan MD    Admission Diagnosis: IIH (idiopathic intracranial hypertension) [G93.2]  Vision loss, bilateral [H54.3]    Admission Date: 10/9/2023  Expected Discharge Date: 10/10/2023    Transition of Care Barriers: None    Payor: CIGNA / Plan: CIGNA OPEN ACCESS PLUS / Product Type: Commercial /     Extended Emergency Contact Information  Primary Emergency Contact: Art Jernigan  Mobile Phone: [phone]  Relation: Significant other  Secondary Emergency Contact: BradySherin  Address: [facility] [address]           [place] [address] [place]  Mobile Phone: [phone]  Relation: Mother  Father: Devan Brady  Address: [facility] [address]           [place] [address] [facility]  Home Phone: [phone]  Mobile Phone: [phone]    Discharge Plan A: Home  Discharge Plan B: Home      [id number] - [place] - [address] AT [facility]  [address]  [facility] [address]  Phone: [phone] Fax: [phone]        Transferred from:  home      Past Medical History:   Diagnosis Date    Acne     Menarche     Age of onset 10         CM met with patient , S/OArt, and mother, Rona, in room for Discharge Planning Assessment.  Patient is able to answer questions.  Per patient, she lives with her S/o in a 2nd floor apartment with a flight of step(s) to enter.   Per patient, she was independent with ADLS and used no dme for ambulation.  Patient will have assistance from her S/o and mother upon discharge.   Discharge Planning Booklet given to patient/family and discussed.  All questions addressed.  CM will follow for needs.    Initial Assessment (most recent)       Adult Discharge Assessment - 10/10/23 1006          Discharge Assessment    Assessment Type Discharge Planning Assessment     Confirmed/corrected address, phone number and insurance Yes     Confirmed  Demographics Correct on Facesheet     Source of Information patient     Communicated NIKKI with patient/caregiver Yes     Reason For Admission Dural Venous Sinus Stenosis     People in Home significant other     Facility Arrived From: home     Do you expect to return to your current living situation? Yes     Do you have help at home or someone to help you manage your care at home? Yes     Who are your caregiver(s) and their phone number(s)? Art Jernigan  Significant other  [phone]     Prior to hospitilization cognitive status: Alert/Oriented     Current cognitive status: Alert/Oriented     Walking or Climbing Stairs --   independent    Dressing/Bathing --   independent    Home Layout Able to live on 1st floor     Equipment Currently Used at Home none     Readmission within 30 days? No     Patient currently being followed by outpatient case management? No     Do you currently have service(s) that help you manage your care at home? No     Do you take prescription medications? Yes     Do you have prescription coverage? Yes     Coverage [employer] Open Access Plus     Do you have any problems affording any of your prescribed medications? No     Is the patient taking medications as prescribed? yes     Who is going to help you get home at discharge? Art Jernigan  Significant other  [phone]     How do you get to doctors appointments? family or friend will provide     Are you on dialysis? No     Do you take coumadin? No     DME Needed Upon Discharge  none     Discharge Plan discussed with: Patient     Transition of Care Barriers None     Discharge Plan A Home     Discharge Plan B Home        Physical Activity    On average, how many days per week do you engage in moderate to strenuous exercise (like a brisk walk)? 0 days     On average, how many minutes do you engage in exercise at this level? 0 min        Financial Resource Strain    How hard is it for you to pay for the very basics like food, housing, medical care,  and heating? Not hard at all        Housing Stability    In the last 12 months, was there a time when you were not able to pay the mortgage or rent on time? No     In the last 12 months, how many places have you lived? 2     In the last 12 months, was there a time when you did not have a steady place to sleep or slept in a shelter (including now)? No        Transportation Needs    In the past 12 months, has lack of transportation kept you from medical appointments or from getting medications? No     In the past 12 months, has lack of transportation kept you from meetings, work, or from getting things needed for daily living? No        Food Insecurity    Within the past 12 months, you worried that your food would run out before you got the money to buy more. Never true     Within the past 12 months, the food you bought just didn't last and you didn't have money to get more. Never true        Stress    Do you feel stress - tense, restless, nervous, or anxious, or unable to sleep at night because your mind is troubled all the time - these days? Not at all        Social Connections    In a typical week, how many times do you talk on the phone with family, friends, or neighbors? More than three times a week     How often do you get together with friends or relatives? Three times a week     How often do you attend Zoroastrianism or Orthodox services? 1 to 4 times per year     Do you belong to any clubs or organizations such as Zoroastrianism groups, unions, fraternal or athletic groups, or school groups? No     How often do you attend meetings of the clubs or organizations you belong to? Never     Are you , , , , never , or living with a partner? Living with partner        Alcohol Use    Q1: How often do you have a drink containing alcohol? Monthly or less     Q2: How many drinks containing alcohol do you have on a typical day when you are drinking? 3 or 4     Q3: How often do you have six or more  drinks on one occasion? Never        OTHER    Name(s) of People in Home Art Sheldonmeetadionne  Significant other  [phone]                          Karen Landa RN, CCRN-K, [facility]  Neuro-Critical Care   X [id number]

## 2023-10-10 NOTE — PT/OT/SLP EVAL
Physical Therapy Evaluation and Discharge Note    Patient Name:  Malinda Brady   MRN:  [id number]    Recommendations:     Discharge Recommendations: home  Discharge Equipment Recommendations: bath bench   Barriers to discharge: None    Assessment:     Malinda Brady is a 23 y.o. female admitted with a medical diagnosis of Chronic cerebral venous sinus thrombosis. At this time, patient is functioning near their prior level of function and does not require further acute PT services. Patient demonstrated bed mobility and ambulation with SBA with no balance deficits, decreased gait speed due to increased caution. She navigated 1 flight of stairs with SBA for balance with reciprocal pattern up and step to pattern down, slight increase in fatigue. Patient inquiring about bath bench as she is anxious about bathing and falling due to vision deficits. Recommending discharge home with no additional therapies.     Recent Surgery: * No procedures listed * 1 Day Post-Op    Plan:     During this hospitalization, patient does not require further acute PT services.  Please re-consult if situation changes.      Subjective     Chief Complaint: not stated  Patient/Family Comments/goals: To return home  Pain/Comfort:  Pain Rating 1: 0/10  Pain Rating Post-Intervention 1: 0/10    Patients cultural, spiritual, Mu-ism conflicts given the current situation: no    Living Environment:  Patient lives with fiance and dog in 2nd floor apt with 1 flight of stairs to enter, L hand rail.   Prior to admission, patients level of function was independent.  Equipment used at home: none.  DME owned (not currently used): none.  Upon discharge, patient will have assistance from "[employer].    Objective:     Communicated with RN prior to session.  Patient found HOB elevated with blood pressure cuff, telemetry, pulse ox (continuous) upon PT entry to room.    General Precautions: Standard, aspiration, fall    Orthopedic Precautions:N/A   Braces:  N/A  Respiratory Status: Room air    Exams:  Cognitive Exam:  Patient is oriented to Person, Place, Time, and Situation  Sensation:    -       Intact  RLE ROM: WNL  RLE Strength: WNL  LLE ROM: WNL  LLE Strength: WNL    Functional Mobility:  Bed Mobility:     Scooting: supervision  Supine to Sit: supervision  Sit to Supine: supervision  Transfers:     Sit to Stand:  stand by assistance with no AD  Gait: patient ambulated 75' with SBA no AD. Decreased gait speed.   Stairs:  Pt ascended/descended 1 flight(s) with No Assistive Device with right handrail with Stand-by Assistance. Reciprocal pattern ascending, step to R pattern descending.    AM-PAC 6 CLICK MOBILITY  Total Score:24       Treatment and Education:  Education: patient educated on POC and discharge from therapy, safety with mobility upon return home, discharge recommendations and equipment recommendations. Patient verbalized understanding of all topics.   Education and cues on step to stair pattern for safety and decreased fatigue, educated patient on increasing activity tolerance safely at home. Cues for increased step height and length to decrease falls risk due to foot clearance.     AM-PAC 6 CLICK MOBILITY  Total Score:24     Patient left HOB elevated with all lines intact, call button in reach, and RN notified.    GOALS:   Multidisciplinary Problems       Physical Therapy Goals       Not on file              Multidisciplinary Problems (Resolved)          Problem: Physical Therapy    Goal Priority Disciplines Outcome Goal Variances Interventions   Physical Therapy Goal   (Resolved)     PT, PT/OT Met     Description: Patient evaluated and discharged, no goals or care plan created.                           History:     Past Medical History:   Diagnosis Date    Acne     Menarche     Age of onset 10       Past Surgical History:   Procedure Laterality Date    TONSILLECTOMY      Tonsils         Time Tracking:     PT Received On: 10/10/23  PT Start Time: 0840      PT Stop Time: 0853  PT Total Time (min): 13 min     Billable Minutes: Evaluation 5 minutes and Gait Training 8 minutes      10/10/2023

## 2023-10-10 NOTE — DISCHARGE SUMMARY
[facility] Neuro Critical Care  Discharge Summary      Admit Date: 10/9/2023    Discharge Date and Time:  10/10/2023 7:31 AM    Attending Physician: Andrew Alvarenga MD     Reason for Admission: IIH w/ left transverse sinus stenosis now s/p venous stenting    Procedures Performed: Cerebral venogram w/ left transverse sinus stenting    Hospital Course: Patient was admitted for cerebral venogram with left transverse sinus stenting on 10/09/23 and underwent procedure without periprocedural complications and was monitored closely in the ICU setting without issue. At the time of discharge, the patient was tolerating PO intake without N/V, dysphagia, denied bowel or bladder dysfunction, denied new neurological symptoms, and reported pain controlled with current regimen. The patient was accompanied by family and woke from anesthesia at baseline neuro-status. The groin was soft without a hematoma, distal pulses were palpable, and vital signs were stable. The patient will follow up in clinic as indicated in discharge instructions. All questions were answered and continued treatment/woundcare instructions were discussed in detail prior to discharge.    Goals of Care Treatment Preferences:  Code Status: Full Code      Consults: none    Significant Diagnostic Studies: N/A    Final Diagnoses:    Principal Problem: Chronic cerebral venous sinus thrombosis   Secondary Diagnoses:   Active Hospital Problems    Diagnosis  POA    *Dural Venous Sinus Stenosis [G08]  Unknown    Papilledema [H47.10]  Unknown    IIH (idiopathic intracranial hypertension) [G93.2]  Yes    Migraine without aura and without status migrainosus, not intractable [G43.009]  Yes     Chronic    Morbid obesity with BMI of 40.0-44.9, adult [E66.01, Z68.41]  Not Applicable     Chronic     Stable. The patient is asked to make an attempt to improve diet and exercise patterns to aid in medical management of this problem.          Resolved Hospital Problems   No resolved  problems to display.       Discharged Condition: good    Disposition: Home or Self Care    Follow Up/Patient Instructions:     Medications:  Reconciled Home Medications:      Medication List        START taking these medications      aspirin 81 MG Chew  Take 1 tablet (81 mg total) by mouth once daily.            CONTINUE taking these medications      clopidogreL 75 mg tablet  Commonly known as: PLAVIX  Take 1 tablet (75 mg total) by mouth once daily.     ondansetron 4 MG Tbdl  Commonly known as: ZOFRAN-ODT  Take 4 mg by mouth every 8 (eight) hours as needed.     prochlorperazine 10 MG tablet  Commonly known as: COMPAZINE  Take 1 tablet (10 mg total) by mouth every 6 (six) hours as needed (migraine or nausea).     rizatriptan 10 MG tablet  Commonly known as: MAXALT  1 tab PO PRN migraine. May repeat every 2 hours for max 3 tabs in 24 hours. Use no more than 10 days per month.     tiZANidine 4 MG tablet  Commonly known as: ZANAFLEX  Half or full tablet by mouth at night as needed for muscle spasm     topiramate 50 MG tablet  Commonly known as: TOPAMAX  Take 1 tablet (50 mg total) by mouth 2 (two) times daily.            No discharge procedures on file.

## 2023-10-10 NOTE — SUBJECTIVE & OBJECTIVE
Interval History: As above.    Review of Systems   Constitutional:  Positive for fatigue. Negative for activity change and appetite change.   HENT:  Negative for tinnitus and trouble swallowing.    Eyes:  Positive for photophobia and visual disturbance.   Respiratory:  Negative for apnea, cough and shortness of breath.    Cardiovascular:  Negative for chest pain and leg swelling.   Gastrointestinal:  Negative for abdominal pain and diarrhea.   Endocrine: Negative for cold intolerance and heat intolerance.   Genitourinary:  Negative for dysuria and hematuria.   Musculoskeletal:  Negative for arthralgias and myalgias.   Skin:  Negative for pallor and rash.   Neurological:  Positive for headaches. Negative for tremors, seizures and numbness.   Psychiatric/Behavioral:  Negative for agitation and confusion.      Objective:     Vitals:    Temp: 98.4 °F (36.9 °C)  Pulse: 71  Rhythm: normal sinus rhythm  BP: 121/60  MAP (mmHg): 84  Resp: 19  SpO2: 97 %    Temp  Min: 97.1 °F (36.2 °C)  Max: 98.8 °F (37.1 °C)  Pulse  Min: 64  Max: 116  BP  Min: 112/55  Max: 156/97  MAP (mmHg)  Min: 77  Max: 119  Resp  Min: 12  Max: 24  SpO2  Min: 94 %  Max: 100 %    10/09 0701 - 10/10 0700  In: 1340 [P.O.:940]  Out: 1500 [Urine:1500]            Physical Exam  Vitals reviewed.   HENT:      Head: Normocephalic.   Eyes:      Extraocular Movements: Extraocular movements intact.      Pupils: Pupils are equal, round, and reactive to light.   Cardiovascular:      Rate and Rhythm: Normal rate and regular rhythm.      Pulses: Normal pulses.   Pulmonary:      Effort: Pulmonary effort is normal.      Breath sounds: Normal breath sounds.   Abdominal:      General: There is no distension.      Palpations: Abdomen is soft.      Tenderness: There is no abdominal tenderness.   Musculoskeletal:         General: No swelling. Normal range of motion.      Cervical back: Normal range of motion. No rigidity.   Skin:     General: Skin is warm.      Capillary  Refill: Capillary refill takes 2 to 3 seconds.   Neurological:      General: No focal deficit present.      Mental Status: She is alert and oriented to person, place, and time. Mental status is at baseline.      Cranial Nerves: No cranial nerve deficit.      Sensory: No sensory deficit.      Motor: No weakness.      Coordination: Coordination normal.      Gait: Gait normal.        Today I personally reviewed pertinent medications, imaging, and laboratory results.    Estimated Creatinine Clearance: 150.6 mL/min (based on SCr of 0.8 mg/dL).

## 2023-10-10 NOTE — PLAN OF CARE
[facility] Neuro Critical [facility]  Discharge Final Note    Primary Care Provider: Dawood Ryan MD    Expected Discharge Date: 10/10/2023    Patient discharging to home today with no post acute needs per MD    .disc    Final Discharge Note (most recent)       Final Note - 10/10/23 1013          Final Note    Assessment Type Final Discharge Note     Anticipated Discharge Disposition Home or Self Care     What phone number can be called within the next 1-3 days to see how you are doing after discharge? [phone]     Hospital Resources/Appts/Education Provided Appointments scheduled and added to AVS                     Important Message from Medicare na             Contact Info       Dawood Ryan MD   Specialty: Family Medicine   Relationship: PCP - General    [address] [place] [address]   Phone: [phone]       Next Steps: Go on 10/26/2023    Instructions: Hospital follow up at 1:30 pm          Karen Landa RN, CCRN-K, [facility]  Neuro-Critical Care   X [phone]

## 2023-10-10 NOTE — ANESTHESIA POSTPROCEDURE EVALUATION
Anesthesia Post Evaluation    Patient: Malinda Brady    Procedure(s) Performed: * No procedures listed *    Final Anesthesia Type: general      Patient location during evaluation: ICU  Patient participation: Yes- Able to Participate  Level of consciousness: awake and alert  Post-procedure vital signs: reviewed and stable  Pain management: adequate  Airway patency: patent    PONV status at discharge: No PONV  Anesthetic complications: no      Cardiovascular status: blood pressure returned to baseline  Respiratory status: unassisted  Hydration status: euvolemic  Follow-up not needed.          Vitals Value Taken Time   /60 10/10/23 0702   Temp 36.7 °C (98 °F) 10/10/23 0305   Pulse 70 10/10/23 0721   Resp 17 10/10/23 0721   SpO2 97 % 10/10/23 0721   Vitals shown include unvalidated device data.      No case tracking events are documented in the log.      Pain/Donn Score: Pain Rating Prior to Med Admin: 4 (10/10/2023  6:17 AM)

## 2023-10-10 NOTE — PROGRESS NOTES
Population Health Chart Review & Patient Outreach Details:     Additional Care Coordinator Notes:      Reason for Outreach Encounter:     []  Non-Compliant Report   []  Payor Report ([employer], PHN, BCBS, MSSP, MCIP, UHC, etc.)   [x]  Chart Review   []  Weekly Bulk Order Report- Order placed               []  Epic Campaign     Updates Requested / Reviewed:     []  Care Everywhere    [x]     [x]  External Sources ([facility], [employer]),               []  External Source DIS              []  External Source [place]   []  Care Team Updated    Patient Outreach Method:      []  Telephone Outreach Completed   [] Successful   [] Left Voicemail   [] Unable to Contact (wrong number, no voicemail)  [x]  [employer] Portal Outreach Sent  []  Letter Outreach Mailed        Health Maintenance Topics Addressed and Outreach Outcomes / Actions Taken:        []      Breast Cancer Screening   []  Mammo Scheduled      []  External Records Requested     []  Patient Declined     []  Patient Will Call Back to Schedule     []  Patient Will Schedule with External Provider / Order Routed if Applicable     [] MAMMOGRAM ORDERED     [] External Records Uploaded             [x]       Cervical Cancer Screening []  Pap Scheduled      []  External Records Requested     []  Patient Declined     []  Patient Will Call Back to Schedule     []  Patient Will Schedule with External Provider     [] External Records Uploaded               []          Colorectal Cancer Screening [] Colonoscopy Case Request or Referral Placed     []  External Records Requested     []  Patient Declined     []  Patient Will Call Back to Schedule     []  Patient Will Schedule with External Provider     []  Fit Kit Mailed (add the SmartPhrase under additional notes)     []  Reminded Patient to Complete Home Test     [] FIT KIT ORDERED     [] External Records Uploaded             []      Diabetic Eye Exam []  Eye Camera Scheduled or Optometry Referral Placed     []  External  Records Requested     []  Patient Declined     []  Patient Will Call Back to Schedule     []  Patient Will Schedule with External Provider     [] External Records Uploaded             []      Blood Pressure Control []  Primary Care Follow Up Visit Scheduled     []  Remote Blood Pressure Reading Captured     []  Patient Declined     []  Patient Will Call Back / Patient Will Send Portal Message with Reading     []  Patient Will Call Back to Schedule Provider Visit             []       HbA1c & Other Labs []  Lab Appt Scheduled for Due Labs     []  Primary Care Follow Up Visit Scheduled      []  Reminded Patient to Complete Home Test     []  Patient Declined     []  Patient Will Call Back to Schedule     []  Patient Will Schedule with External Provider / Order Routed if Applicable     [] LABS ORDERED     [] External Records Uploaded           []    Schedule Primary Care Appt []  Primary Care Appt Scheduled     []  Patient Declined     []  Patient Will Call Back to Schedule     []  Pt Established with External Provider & Updated Care Team             []      Medication Adherence []  Primary Care Appointment Scheduled     []  Reminder Added to Upcoming Primary Care Appt Notes     []  Patient Reminded to  Prescription     []  Patient Declined, Provider Notified if Needed     []  Sent Provider Message to Review and/or Add Exclusion to Problem List             []      Osteoporosis Screening []  DXA Appointment Scheduled     []  External Records Requested     []  Patient Declined     []  Patient Will Call Back to Schedule     []  Patient Will Schedule with External Provider / Order Routed if Applicable     [] DEXA ORDERED     [] External Records Uploaded

## 2023-10-10 NOTE — DISCHARGE INSTRUCTIONS
--Patient stable for discharge to home    --Please take prescriptions as detailed in medication list      -If you are prescribed Plavix or Brilinta DO NOT stop taking unless your Neurosurgeon specifically tells you, also DO NOT take nexium or protonix while on these medications      -If there are any issues getting your Plavix or Brilinta for any reason, call the Neurosurgery Clinic immediately    --All questions/concerns addressed and answered    --Please followup with neuroendovascular clinic to be arranged by Clinic     --Please call immediately for any new onset nausea/vomiting/fever/chills, numbness/tingling/weakness, groin swelling, or lower extremity color changes    Groin Care Instructions:  -If you have any dressings at discharge, please remove 24 hours after the surgery  -You may shower daily but do not soak or submerge wound in water for 7 days  -Keep all groin site clean, dry, and open to air.  -Do not apply creams or ointments to the wound.  -Call Neurosurgery if the wound opens, drains, or becomes red, or groin become swollen.  -No lifting greater than 10 pounds   -No spreading of legs or stretching groin muscles for one week (no sexual activity) for 7 days  -No driving

## 2023-10-10 NOTE — PT/OT/SLP EVAL
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 female with an SLP diagnosis of  speech, language, cognitive linguistic skills WFL .  She presents with oropharyngeal phases of swallow WFL.    History:     Past Medical History:   Diagnosis Date    Acne     Menarche     Age of onset 10       Past Surgical History:   Procedure Laterality Date    TONSILLECTOMY      Tonsils         Social History: Patient lives with her fiance. IND pta including working as a .  Currently on a medical leave 2/2 visual issues since August of this year.      Prior Intubation HX:  for procedure 10/9    Modified Barium Swallow: none reported    Chest X-Rays: 10/9: "Suspected mild developing infiltrate at the left lung base.     Mild elevation of the right hemidiaphragm."    Prior diet: reg/thin    Subjective     "It is a little harder to remember."    Pain/Comfort:  Pain Rating 1: 3/10  Location 1:  (head and throat)  Pain Addressed 1: Distraction  Pain Rating Post-Intervention 1: 3/10    Respiratory Status: Room air    Objective:     Cognitive Status:    Attention No obvious deficits observed    Orientation Oriented x4  Memory Immediate Recall 100%  and Delayed 2/3 unassociated words recalled after delay IND, 3/3 with min cues  Problem Solving Categories x2/2, Sequencing x1/1, Solutions x2/2, and Compare/contrast x2/2, money/time management x2/2       Receptive Language:   Comprehension: " "  WFL    Pragmatics:    WFL    Expressive Language:  Verbal:    Fluent and appropriate       Motor Speech:  WFL    Voice:   WFL    Visual-Spatial:  Pt reporting "tunnel vision" with limited peripheral vision with blurry midline vision    Reading:   WFL at sentence level    Written Expression:   WFL at sentence level     Oral Musculature Evaluation  Oral Musculature: WFL  Dentition: present and adequate  Secretion Management: adequate  Mucosal Quality: adequate  Mandibular Strength and Mobility: WFL  Oral Labial Strength and Mobility: WFL  Lingual Strength and Mobility: WFL  Buccal Strength and Mobility: WFL  Volitional Cough: WFL  Volitional Swallow: WFL  Voice Prior to PO Intake: clear    Bedside Swallow Eval:   Consistencies Assessed:  Thin liquids    Solids        Oral Phase:   WFL    Pharyngeal Phase:   no overt clinical signs/symptoms of aspiration  no overt clinical signs/symptoms of pharyngeal dysphagia    Compensatory Strategies  None    Treatment: Education provided re: role of SLP, diet recs, swallow precs, s/s aspiration, results of evaluation, discharge from SLP services with re-consult as appropriate.  Pt verbalized understanding and agreement.       Goals:   Multidisciplinary Problems       SLP Goals       Not on file              Multidisciplinary Problems (Resolved)          Problem: SLP    Goal Priority Disciplines Outcome   SLP Goal   (Resolved)     SLP Met                       Plan:     Patient to be seen:      Plan of Care expires:     Plan of Care reviewed with:  patient, significant other   SLP Follow-Up:  No       Discharge recommendations:  Discharge Facility/Level of Care Needs: home   Barriers to Discharge:  None    Time Tracking:     SLP Treatment Date:   10/10/23  Speech Start Time:  0641  Speech Stop Time:  0656     Speech Total Time (min):  15 min    Billable Minutes: Eval 8  and Eval Swallow and Oral Function 7    10/10/2023       "

## 2023-10-10 NOTE — ASSESSMENT & PLAN NOTE
History of papilledema, on regular follow-ups with outpatient neuro-ophtalmology  POD1 stenting of L transverse sinus  PRN pain meds.

## 2023-10-10 NOTE — PLAN OF CARE
Problem: Physical Therapy  Goal: Physical Therapy Goal  Description: Patient evaluated and discharged, no goals or care plan created.      Outcome: Met

## 2023-10-10 NOTE — CARE UPDATE
PIV's removed, AVS reviewed with patient and family, monitoring removed, medications and belongings given to significant other. Pt dressed and escorted to vehicle.   
Patient arrived to [facility] from IR by ICU Bed    Report received from:  IR TACO Valerio    Type of stroke/diagnosis: IIH venous stent placement    Thrombectomy start and end time - 1120    Current symptoms: lethargy, oriented x4 following x4    Skin Assessment done: Y  Wounds noted: none, groin site intact L - arterial R - venous  *If wounds noted, was Wound Care consulted? N  *If wounds noted, LDA placed? Y  Skin Assessment Verified by: David Wells Completed? N - HOB flat for 2 hours post procedure    Patient Belongings on Admit: clothes, sandals,     NCC notified: JAGUAR Roberts    
Yes

## 2023-10-24 ENCOUNTER — OFFICE VISIT (OUTPATIENT)
Dept: NEUROLOGY | Facility: CLINIC | Age: 23
End: 2023-10-24
Payer: COMMERCIAL

## 2023-10-24 DIAGNOSIS — G93.2 IIH (IDIOPATHIC INTRACRANIAL HYPERTENSION): Primary | ICD-10-CM

## 2023-10-24 DIAGNOSIS — H53.9 VISUAL DISTURBANCE: Chronic | ICD-10-CM

## 2023-10-24 DIAGNOSIS — E66.01 MORBID OBESITY WITH BMI OF 40.0-44.9, ADULT: Chronic | ICD-10-CM

## 2023-10-24 PROCEDURE — 99215 OFFICE O/P EST HI 40 MIN: CPT | Mod: S$GLB,,, | Performed by: NURSE PRACTITIONER

## 2023-10-24 PROCEDURE — 3044F PR MOST RECENT HEMOGLOBIN A1C LEVEL <7.0%: ICD-10-PCS | Mod: CPTII,S$GLB,, | Performed by: NURSE PRACTITIONER

## 2023-10-24 PROCEDURE — 99999 PR PBB SHADOW E&M-EST. PATIENT-LVL III: ICD-10-PCS | Mod: PBBFAC,,, | Performed by: NURSE PRACTITIONER

## 2023-10-24 PROCEDURE — 99999 PR PBB SHADOW E&M-EST. PATIENT-LVL III: CPT | Mod: PBBFAC,,, | Performed by: NURSE PRACTITIONER

## 2023-10-24 PROCEDURE — 1159F MED LIST DOCD IN RCRD: CPT | Mod: CPTII,S$GLB,, | Performed by: NURSE PRACTITIONER

## 2023-10-24 PROCEDURE — 1111F PR DISCHARGE MEDS RECONCILED W/ CURRENT OUTPATIENT MED LIST: ICD-10-PCS | Mod: CPTII,S$GLB,, | Performed by: NURSE PRACTITIONER

## 2023-10-24 PROCEDURE — 1111F DSCHRG MED/CURRENT MED MERGE: CPT | Mod: CPTII,S$GLB,, | Performed by: NURSE PRACTITIONER

## 2023-10-24 PROCEDURE — 1160F RVW MEDS BY RX/DR IN RCRD: CPT | Mod: CPTII,S$GLB,, | Performed by: NURSE PRACTITIONER

## 2023-10-24 PROCEDURE — 3044F HG A1C LEVEL LT 7.0%: CPT | Mod: CPTII,S$GLB,, | Performed by: NURSE PRACTITIONER

## 2023-10-24 PROCEDURE — 1160F PR REVIEW ALL MEDS BY PRESCRIBER/CLIN PHARMACIST DOCUMENTED: ICD-10-PCS | Mod: CPTII,S$GLB,, | Performed by: NURSE PRACTITIONER

## 2023-10-24 PROCEDURE — 1159F PR MEDICATION LIST DOCUMENTED IN MEDICAL RECORD: ICD-10-PCS | Mod: CPTII,S$GLB,, | Performed by: NURSE PRACTITIONER

## 2023-10-24 PROCEDURE — 99215 PR OFFICE/OUTPT VISIT, EST, LEVL V, 40-54 MIN: ICD-10-PCS | Mod: S$GLB,,, | Performed by: NURSE PRACTITIONER

## 2023-10-24 NOTE — PROGRESS NOTES
"[facility] VISIT      SUBJECTIVE:    History for Present Illness: Malinda Brady is a 23 y.o.  female  with PMHx of migraines ( treatment by Kika Najera NP) who presents to me following successful stenting of the left transverse sinus.    Relevant HPI:    Patient with a long history of migraine since approximately the age of 16. headaches are typically unilateral, moderate severe intensity, worsened with activity, pounding quality and associated with substantive to light and sound. Sudden worsening of migraine in August.  Patient seen in ER on 08/ 14/23 for migraine  That would not break.  She was prescribed steroids. since ED visit, patient  reports an  new change in  vision specifically  her peripheral vision is "gone" and distal vision is significantly blurry.  Patient was seen by Dr. Cantu on 10/05/2023-note reviewed in epic"Papilledema associated with increased intracranial pressure".    Interval history:    At the time of today's visit, the patient denies new or worsening focal neurologic symptoms concerning for new stroke or TIA.  Patient is doing well overall and recovering well postprocedure..  She reports headaches have improved; decreased in frequency and severity.  Denies any change in vision.  She denies associated vertigo, double vision, focal weakness or numbness, gait imbalance,  or language disturbance.  She remains independent with ADLs.  She is adherent with home medications including aspirin and Plavix.    Past Medical History:   Diagnosis Date    Acne     Menarche     Age of onset 10     Past Surgical History:   Procedure Laterality Date    TONSILLECTOMY      Tonsils       Family History   Problem Relation Age of Onset    Acne Brother     Breast cancer Paternal Grandmother     Migraines Father     Arthritis Mother     Migraines Sister     No Known Problems Sister     Colon cancer Neg Hx     Diabetes Neg Hx     Hypertension Neg Hx     Ovarian cancer Neg Hx     " Melanoma Neg Hx     Lupus Neg Hx     Psoriasis Neg Hx     Eczema Neg Hx     Cancer Neg Hx         Current Outpatient Medications:     aspirin 81 MG Chew, Chew and swallow 1 tablet (81 mg total) by mouth once daily., Disp: 30 tablet, Rfl: 11    clopidogreL (PLAVIX) 75 mg tablet, Take 1 tablet (75 mg total) by mouth once daily., Disp: 30 tablet, Rfl: 11    ondansetron (ZOFRAN-ODT) 4 MG TbDL, Take 4 mg by mouth every 8 (eight) hours as needed., Disp: , Rfl:     prochlorperazine (COMPAZINE) 10 MG tablet, Take 1 tablet (10 mg total) by mouth every 6 (six) hours as needed (migraine or nausea)., Disp: 60 tablet, Rfl: 11    rizatriptan (MAXALT) 10 MG tablet, 1 tab PO PRN migraine. May repeat every 2 hours for max 3 tabs in 24 hours. Use no more than 10 days per month., Disp: 9 tablet, Rfl: 11    tiZANidine (ZANAFLEX) 4 MG tablet, Half or full tablet by mouth at night as needed for muscle spasm, Disp: 30 tablet, Rfl: 11    topiramate (TOPAMAX) 50 MG tablet, Take 1 tablet (50 mg total) by mouth 2 (two) times daily. (Patient taking differently: Take 50 mg by mouth nightly.), Disp: 60 tablet, Rfl: 11     Review of Systems:  Review of systems is negative except for the symptoms mentioned in HPI    Physical exam:    There were no vitals taken for this visit.    General: Well-developed, well-groomed. No apparent distress  HENT: Normocephalic, atraumatic.    Cardiovascular: Regular rate and rhythm  Chest: No audible wheezes, stridor, ronchi appreciated.  Musculoskeletal: No peripheral edema     Neurologic Exam: The patient is awake, alert and oriented to person, place, time and situation. Attentive, vigilant during exam. Language is fluent. Naming & repetition intact, +2-step commands.  Fund of knowledge is appropriate. Well organized thoughts.     Cranial nerves:   CN II: Visual fields are full to confrontation. Pupils are 4 mm and briskly reactive to light.  CN III, IV, VI: EOMI, no nystagmus, no ptosis  CN V: Facial sensation is  intact in all 3 divisions bilaterally.  CN VII: Face is symmetric with normal eye closure and smile.  CN VIII: Hearing is normal bilaterally  CN IX, X: Palate elevates symmetrically. Phonation is normal.  CN XI: Head turning and shoulder shrug are intact  CN XII: Tongue is midline with normal movements and no atrophy.     Motor examination of all extremities demonstrates normal bulk and tone in all four limbs. There are no atrophy or fasciculations.      Sensory examination is normal light touch in BUE and BLE.    Deep tendon reflexes No clonus.      Gait: Normal tandem, and casual gait.     Coordination: No dysmetria with finger-to-nose. Rapid alternating movements and fine finger movements are intact.         Relevant Labwork:  Recent Labs   Lab 10/09/23  1428   Hemoglobin A1C 4.8   LDL Cholesterol 88.4   HDL 26 L   Triglycerides 68   Cholesterol 128       Diagnostic Results:  Imaging was independently reviewed by myself, along with the associated radiology report    Brain Imaging   MRI of the brain with/without OSH 09/06/2023:  Bilateral transverse/sigmoid sinus stenosis  Vessel Imaging   IR angiogram 10/09/2023:  Normal cerebral angiogram.  Stenosis of the lateral transverse sinus treated successfully with stenting  Cardiac Imaging     Assessment:  Malinda Brady  is a 23 y.o.  female  seen today in clinic for follow-up assessment and recommendations. The following recommendations and plan were discussed in depth with the patient who voiced understanding and was in agreement.  Plan:  Idiopathic intracranial hypertension  S/p successful stenting of the left transverse sinus  -continue DAPT with Aspirin 325 mg daily and Plavix 75 mg daily for 3 months post procedure.   -Continue with scheduled Neuro-Ophthalmology visit  -continue Topamax at current dose  -counseled on lifestyle modifications including diet and weight management  -we will plan for repeat MRI of the brain with and without in 3 months to reassess  previously stented left transverse sinus    Migraine  -continue to follow-up outpatient with Kika Najera for surveillance and long-term management     Patient/Family teaching provided during this visit  -counseled on lifestyle modifications including diet and weight management      I will plan on having Malinda return to clinic in 3 months following MRI of the brain. The patient can contact my office with any questions or concerns they may have as they arise in the interim.       Collaborating Physician, Dr Haynes, was available during today's encounter. Any change to plan along with cosign to appear in the EMR    JUAN Cooper  Department of Vascular Neurology  [facility]  [phone]    This note is dictated on M*Modal Fluency Direct word recognition program. There are word recognition mistakes that are occasionally missed on review

## 2023-10-26 ENCOUNTER — OFFICE VISIT (OUTPATIENT)
Dept: FAMILY MEDICINE | Facility: CLINIC | Age: 23
End: 2023-10-26
Payer: COMMERCIAL

## 2023-10-26 VITALS
HEIGHT: 67 IN | OXYGEN SATURATION: 99 % | WEIGHT: 274.06 LBS | HEART RATE: 88 BPM | RESPIRATION RATE: 18 BRPM | DIASTOLIC BLOOD PRESSURE: 74 MMHG | BODY MASS INDEX: 43.01 KG/M2 | SYSTOLIC BLOOD PRESSURE: 128 MMHG

## 2023-10-26 DIAGNOSIS — H53.9 VISUAL DISTURBANCE: Chronic | ICD-10-CM

## 2023-10-26 DIAGNOSIS — Z00.00 WELLNESS EXAMINATION: ICD-10-CM

## 2023-10-26 DIAGNOSIS — G93.2 IIH (IDIOPATHIC INTRACRANIAL HYPERTENSION): Primary | ICD-10-CM

## 2023-10-26 PROCEDURE — 1160F RVW MEDS BY RX/DR IN RCRD: CPT | Mod: CPTII,S$GLB,, | Performed by: FAMILY MEDICINE

## 2023-10-26 PROCEDURE — 3044F HG A1C LEVEL LT 7.0%: CPT | Mod: CPTII,S$GLB,, | Performed by: FAMILY MEDICINE

## 2023-10-26 PROCEDURE — 3074F SYST BP LT 130 MM HG: CPT | Mod: CPTII,S$GLB,, | Performed by: FAMILY MEDICINE

## 2023-10-26 PROCEDURE — 99395 PR PREVENTIVE VISIT,EST,18-39: ICD-10-PCS | Mod: EP,S$GLB,, | Performed by: FAMILY MEDICINE

## 2023-10-26 PROCEDURE — 99999 PR PBB SHADOW E&M-EST. PATIENT-LVL IV: ICD-10-PCS | Mod: PBBFAC,,, | Performed by: FAMILY MEDICINE

## 2023-10-26 PROCEDURE — 1159F PR MEDICATION LIST DOCUMENTED IN MEDICAL RECORD: ICD-10-PCS | Mod: CPTII,S$GLB,, | Performed by: FAMILY MEDICINE

## 2023-10-26 PROCEDURE — 3074F PR MOST RECENT SYSTOLIC BLOOD PRESSURE < 130 MM HG: ICD-10-PCS | Mod: CPTII,S$GLB,, | Performed by: FAMILY MEDICINE

## 2023-10-26 PROCEDURE — 3008F BODY MASS INDEX DOCD: CPT | Mod: CPTII,S$GLB,, | Performed by: FAMILY MEDICINE

## 2023-10-26 PROCEDURE — 3008F PR BODY MASS INDEX (BMI) DOCUMENTED: ICD-10-PCS | Mod: CPTII,S$GLB,, | Performed by: FAMILY MEDICINE

## 2023-10-26 PROCEDURE — 3078F DIAST BP <80 MM HG: CPT | Mod: CPTII,S$GLB,, | Performed by: FAMILY MEDICINE

## 2023-10-26 PROCEDURE — 3078F PR MOST RECENT DIASTOLIC BLOOD PRESSURE < 80 MM HG: ICD-10-PCS | Mod: CPTII,S$GLB,, | Performed by: FAMILY MEDICINE

## 2023-10-26 PROCEDURE — 99395 PREV VISIT EST AGE 18-39: CPT | Mod: EP,S$GLB,, | Performed by: FAMILY MEDICINE

## 2023-10-26 PROCEDURE — 1159F MED LIST DOCD IN RCRD: CPT | Mod: CPTII,S$GLB,, | Performed by: FAMILY MEDICINE

## 2023-10-26 PROCEDURE — 1160F PR REVIEW ALL MEDS BY PRESCRIBER/CLIN PHARMACIST DOCUMENTED: ICD-10-PCS | Mod: CPTII,S$GLB,, | Performed by: FAMILY MEDICINE

## 2023-10-26 PROCEDURE — 99999 PR PBB SHADOW E&M-EST. PATIENT-LVL IV: CPT | Mod: PBBFAC,,, | Performed by: FAMILY MEDICINE

## 2023-10-26 PROCEDURE — 3044F PR MOST RECENT HEMOGLOBIN A1C LEVEL <7.0%: ICD-10-PCS | Mod: CPTII,S$GLB,, | Performed by: FAMILY MEDICINE

## 2023-10-26 NOTE — PROGRESS NOTES
"Subjective:       Patient ID: Malinda Brady is a 23 y.o. female.    Chief Complaint: Establish Care and Follow-up (Post op Venous sinus stent)    HPI:  Pleasant 23-year-old presents today to establish care.  She is just gone through some surgery to correct intracranial hypertension.  Your symptoms developed mid the patient had symptoms developing while having a migraine which turned out to be slightly different because it was prolonged.  She was then seen and then ultimately referred for MRI which revealed intracranial hypertension.  She did have intervention done through catheterization.  She will be taking Plavix for the next 6 months.  She is had some tunnel vision develop after surgery and she will be following up with Neuro-Ophthalmology.  Review of Systems   Constitutional: Negative.    HENT: Negative.     Eyes: Negative.    Respiratory: Negative.     Cardiovascular: Negative.    Gastrointestinal: Negative.    Endocrine: Negative.    Genitourinary: Negative.    Musculoskeletal: Negative.    Skin: Negative.    Allergic/Immunologic: Negative.    Neurological: Negative.         Visual disturbance with a sense of tunnel vision.   Hematological: Negative.    Psychiatric/Behavioral: Negative.         Objective:      Vitals:    10/26/23 1343   BP: 128/74   Pulse: 88   Resp: 18   SpO2: 99%   Weight: 124.3 kg (274 lb 0.5 oz)   Height: 5' 7" (1.702 m)      Physical Exam  Vitals and nursing note reviewed.   Constitutional:       Appearance: Normal appearance. She is normal weight.   HENT:      Head: Normocephalic and atraumatic.      Nose: Nose normal.      Mouth/Throat:      Mouth: Mucous membranes are moist.      Pharynx: Oropharynx is clear.   Eyes:      Extraocular Movements: Extraocular movements intact.      Conjunctiva/sclera: Conjunctivae normal.      Pupils: Pupils are equal, round, and reactive to light.   Cardiovascular:      Rate and Rhythm: Normal rate and regular rhythm.      Pulses: Normal pulses.      " Heart sounds: Normal heart sounds.   Pulmonary:      Effort: Pulmonary effort is normal.      Breath sounds: Normal breath sounds.   Abdominal:      General: Abdomen is flat. Bowel sounds are normal.      Palpations: Abdomen is soft.   Musculoskeletal:         General: Normal range of motion.      Cervical back: Normal range of motion and neck supple.   Skin:     General: Skin is warm and dry.      Capillary Refill: Capillary refill takes less than 2 seconds.   Neurological:      General: No focal deficit present.      Mental Status: She is alert and oriented to person, place, and time. Mental status is at baseline.   Psychiatric:         Mood and Affect: Mood normal.         Behavior: Behavior normal.         Thought Content: Thought content normal.         Judgment: Judgment normal.         Results for orders placed or performed during the hospital encounter of 10/09/23   Lipid panel   Result Value Ref Range    Cholesterol 128 120 - 199 mg/dL    Triglycerides 68 30 - 150 mg/dL    HDL 26 (L) 40 - 75 mg/dL    LDL Cholesterol 88.4 63.0 - 159.0 mg/dL    HDL/Cholesterol Ratio 20.3 20.0 - 50.0 %    Total Cholesterol/HDL Ratio 4.9 2.0 - 5.0    Non-HDL Cholesterol 102 mg/dL   Hemoglobin A1c   Result Value Ref Range    Hemoglobin A1C 4.8 4.0 - 5.6 %    Estimated Avg Glucose 91 68 - 131 mg/dL   TSH   Result Value Ref Range    TSH 1.016 0.400 - 4.000 uIU/mL   T3   Result Value Ref Range    T3, Total 115 60 - 180 ng/dL   T4, free   Result Value Ref Range    Free T4 0.89 0.71 - 1.51 ng/dL   Comprehensive metabolic panel   Result Value Ref Range    Sodium 136 136 - 145 mmol/L    Potassium 4.1 3.5 - 5.1 mmol/L    Chloride 111 (H) 95 - 110 mmol/L    CO2 17 (L) 23 - 29 mmol/L    Glucose 125 (H) 70 - 110 mg/dL    BUN 9 6 - 20 mg/dL    Creatinine 0.8 0.5 - 1.4 mg/dL    Calcium 9.0 8.7 - 10.5 mg/dL    Total Protein 6.8 6.0 - 8.4 g/dL    Albumin 3.6 3.5 - 5.2 g/dL    Total Bilirubin 0.6 0.1 - 1.0 mg/dL    Alkaline Phosphatase 56 55 -  135 U/L    AST 18 10 - 40 U/L    ALT 40 10 - 44 U/L    eGFR >60.0 >60 mL/min/1.73 m^2    Anion Gap 8 8 - 16 mmol/L   CBC auto differential   Result Value Ref Range    WBC 15.18 (H) 3.90 - 12.70 K/uL    RBC 4.92 4.00 - 5.40 M/uL    Hemoglobin 15.2 12.0 - 16.0 g/dL    Hematocrit 42.1 37.0 - 48.5 %    MCV 86 82 - 98 fL    MCH 30.9 27.0 - 31.0 pg    MCHC 36.1 (H) 32.0 - 36.0 g/dL    RDW 12.4 11.5 - 14.5 %    Platelets 249 150 - 450 K/uL    MPV 10.2 9.2 - 12.9 fL    Immature Granulocytes 0.7 (H) 0.0 - 0.5 %    Gran # (ANC) 12.4 (H) 1.8 - 7.7 K/uL    Immature Grans (Abs) 0.10 (H) 0.00 - 0.04 K/uL    Lymph # 1.8 1.0 - 4.8 K/uL    Mono # 0.9 0.3 - 1.0 K/uL    Eos # 0.0 0.0 - 0.5 K/uL    Baso # 0.02 0.00 - 0.20 K/uL    nRBC 0 0 /100 WBC    Gran % 81.4 (H) 38.0 - 73.0 %    Lymph % 11.9 (L) 18.0 - 48.0 %    Mono % 5.9 4.0 - 15.0 %    Eosinophil % 0.0 0.0 - 8.0 %    Basophil % 0.1 0.0 - 1.9 %    Differential Method Automated    Magnesium   Result Value Ref Range    Magnesium 1.9 1.6 - 2.6 mg/dL   Phosphorus   Result Value Ref Range    Phosphorus 1.4 (L) 2.7 - 4.5 mg/dL   APTT   Result Value Ref Range    aPTT 24.2 21.0 - 32.0 sec   Protime-INR   Result Value Ref Range    Prothrombin Time 10.8 9.0 - 12.5 sec    INR 1.0 0.8 - 1.2   POCT urine pregnancy   Result Value Ref Range    POC Preg Test, Ur Negative Negative     Acceptable Yes    Type & Screen   Result Value Ref Range    Indirect Sly NEG     Specimen Outdate 10/12/2023 23:59    Group & Rh   Result Value Ref Range    ABO A     Rh Type POS    POCT glucose   Result Value Ref Range    POCT Glucose 67 (L) 70 - 110 mg/dL      Assessment:       1. IIH (idiopathic intracranial hypertension)    2. Visual disturbance    3. Wellness examination        Plan:       IIH (idiopathic intracranial hypertension)    Visual disturbance    Wellness examination  -     Ambulatory referral/consult to Gynecology; Future; Expected date: 11/02/2023  -     HIV 1/2 Ag/Ab (4th Gen);  Future; Expected date: 10/26/2023  -     Hepatitis C Antibody; Future; Expected date: 10/26/2023          Medication List with Changes/Refills   Current Medications    ASPIRIN 81 MG CHEW    Chew and swallow 1 tablet (81 mg total) by mouth once daily.    CLOPIDOGREL (PLAVIX) 75 MG TABLET    Take 1 tablet (75 mg total) by mouth once daily.    RIZATRIPTAN (MAXALT) 10 MG TABLET    1 tab PO PRN migraine. May repeat every 2 hours for max 3 tabs in 24 hours. Use no more than 10 days per month.    TIZANIDINE (ZANAFLEX) 4 MG TABLET    Half or full tablet by mouth at night as needed for muscle spasm    TOPIRAMATE (TOPAMAX) 50 MG TABLET    Take 1 tablet (50 mg total) by mouth 2 (two) times daily.   Discontinued Medications    ONDANSETRON (ZOFRAN-ODT) 4 MG TBDL    Take 4 mg by mouth every 8 (eight) hours as needed.    PROCHLORPERAZINE (COMPAZINE) 10 MG TABLET    Take 1 tablet (10 mg total) by mouth every 6 (six) hours as needed (migraine or nausea).

## 2023-10-26 NOTE — PATIENT INSTRUCTIONS
His back year ask your pharmacist about the tetanus vaccine, Tdap, flu shot and completion of COVID-19 vaccine series if you please.

## 2023-10-31 ENCOUNTER — TELEPHONE (OUTPATIENT)
Dept: NEUROLOGY | Facility: CLINIC | Age: 23
End: 2023-10-31
Payer: COMMERCIAL

## 2023-11-02 ENCOUNTER — OFFICE VISIT (OUTPATIENT)
Dept: OPHTHALMOLOGY | Facility: CLINIC | Age: 23
End: 2023-11-02
Payer: COMMERCIAL

## 2023-11-02 ENCOUNTER — CLINICAL SUPPORT (OUTPATIENT)
Dept: OPHTHALMOLOGY | Facility: CLINIC | Age: 23
End: 2023-11-02
Payer: COMMERCIAL

## 2023-11-02 DIAGNOSIS — G93.2 IIH (IDIOPATHIC INTRACRANIAL HYPERTENSION): ICD-10-CM

## 2023-11-02 DIAGNOSIS — G43.009 MIGRAINE WITHOUT AURA AND WITHOUT STATUS MIGRAINOSUS, NOT INTRACTABLE: Chronic | ICD-10-CM

## 2023-11-02 DIAGNOSIS — G08 CHRONIC CEREBRAL VENOUS SINUS THROMBOSIS: Primary | ICD-10-CM

## 2023-11-02 PROCEDURE — 99215 PR OFFICE/OUTPT VISIT, EST, LEVL V, 40-54 MIN: ICD-10-PCS | Mod: S$GLB,,, | Performed by: OPHTHALMOLOGY

## 2023-11-02 PROCEDURE — 1111F DSCHRG MED/CURRENT MED MERGE: CPT | Mod: CPTII,S$GLB,, | Performed by: OPHTHALMOLOGY

## 2023-11-02 PROCEDURE — 99215 OFFICE O/P EST HI 40 MIN: CPT | Mod: S$GLB,,, | Performed by: OPHTHALMOLOGY

## 2023-11-02 PROCEDURE — 99999 PR PBB SHADOW E&M-EST. PATIENT-LVL III: ICD-10-PCS | Mod: PBBFAC,,, | Performed by: OPHTHALMOLOGY

## 2023-11-02 PROCEDURE — 3044F PR MOST RECENT HEMOGLOBIN A1C LEVEL <7.0%: ICD-10-PCS | Mod: CPTII,S$GLB,, | Performed by: OPHTHALMOLOGY

## 2023-11-02 PROCEDURE — 3044F HG A1C LEVEL LT 7.0%: CPT | Mod: CPTII,S$GLB,, | Performed by: OPHTHALMOLOGY

## 2023-11-02 PROCEDURE — 99417 PROLNG OP E/M EACH 15 MIN: CPT | Mod: S$GLB,,, | Performed by: OPHTHALMOLOGY

## 2023-11-02 PROCEDURE — 1111F PR DISCHARGE MEDS RECONCILED W/ CURRENT OUTPATIENT MED LIST: ICD-10-PCS | Mod: CPTII,S$GLB,, | Performed by: OPHTHALMOLOGY

## 2023-11-02 PROCEDURE — 1160F RVW MEDS BY RX/DR IN RCRD: CPT | Mod: CPTII,S$GLB,, | Performed by: OPHTHALMOLOGY

## 2023-11-02 PROCEDURE — 92133 POSTERIOR SEGMENT OCT OPTIC NERVE(OCULAR COHERENCE TOMOGRAPHY) - OU - BOTH EYES: ICD-10-PCS | Mod: S$GLB,,, | Performed by: OPHTHALMOLOGY

## 2023-11-02 PROCEDURE — 99417 PR PROLONGED SVC, OUTPT, W/WO DIRECT PT CONTACT,  EA ADDTL 15 MIN: ICD-10-PCS | Mod: S$GLB,,, | Performed by: OPHTHALMOLOGY

## 2023-11-02 PROCEDURE — 99999 PR PBB SHADOW E&M-EST. PATIENT-LVL III: CPT | Mod: PBBFAC,,, | Performed by: OPHTHALMOLOGY

## 2023-11-02 PROCEDURE — 1160F PR REVIEW ALL MEDS BY PRESCRIBER/CLIN PHARMACIST DOCUMENTED: ICD-10-PCS | Mod: CPTII,S$GLB,, | Performed by: OPHTHALMOLOGY

## 2023-11-02 PROCEDURE — 92083 HUMPHREY VISUAL FIELD - OU - BOTH EYES: ICD-10-PCS | Mod: S$GLB,,, | Performed by: OPHTHALMOLOGY

## 2023-11-02 PROCEDURE — 92083 EXTENDED VISUAL FIELD XM: CPT | Mod: S$GLB,,, | Performed by: OPHTHALMOLOGY

## 2023-11-02 PROCEDURE — 1159F PR MEDICATION LIST DOCUMENTED IN MEDICAL RECORD: ICD-10-PCS | Mod: CPTII,S$GLB,, | Performed by: OPHTHALMOLOGY

## 2023-11-02 PROCEDURE — 1159F MED LIST DOCD IN RCRD: CPT | Mod: CPTII,S$GLB,, | Performed by: OPHTHALMOLOGY

## 2023-11-02 PROCEDURE — 92133 CPTRZD OPH DX IMG PST SGM ON: CPT | Mod: S$GLB,,, | Performed by: OPHTHALMOLOGY

## 2023-11-02 NOTE — PATIENT INSTRUCTIONS
Although the visual fields have been stable in both eyes over the past few weeks, her OCT tests show reduced optic disc edema on both sides. She apears more affected in her left optic nerve than in her right. I will repeat her exam and special testing in one month.

## 2023-11-02 NOTE — PROGRESS NOTES
HPI    DLS:10/05/2023 Laure  Patient here for 3 week follow up IIH.  Vision still not great.  No eye pain.    Review HVF/OCT    I have personally interviewed the patient, reviewed the history and   examined the patient and agree with the technician's exam.  Last edited by Art Kelsey MD on 11/2/2023  3:06 PM.            Assessment /Plan     For exam results, see Encounter Report.    Dural Venous Sinus Stenosis  -     Leon Visual Field - OU - Extended - Both Eyes; Future  -     Posterior Segment OCT Optic Nerve- Both eyes; Future    IIH (idiopathic intracranial hypertension)  -     Leon Visual Field - OU - Extended - Both Eyes; Future  -     Posterior Segment OCT Optic Nerve- Both eyes; Future    Migraine without aura and without status migrainosus, not intractable  -     Leon Visual Field - OU - Extended - Both Eyes; Future  -     Posterior Segment OCT Optic Nerve- Both eyes; Future      Although the visual fields have been stable in both eyes over the past few weeks, her OCT tests show reduced optic disc edema on both sides. She apears more affected in her left optic nerve than in her right. I will repeat her exam and special testing in one month.

## 2023-11-02 NOTE — LETTER
[facility] - [address]  [address]  [facility] [address]  Phone: [phone]  Fax: [phone]   November 2, 2023    Cathi Potter NP  [address]  [address]  [facility] [address]    Patient: Malinda Brady   MR Number: [id number]   YOB: 2000   Date of Visit: 11/2/2023       Dear Dr. Potter:    Thank you for referring Malinda Brady to me for evaluation. Here is my assessment and plan of care:    Assessment/Plan    For exam results, see Encounter Report.    Dural Venous Sinus Stenosis  -     Leon Visual Field - OU - Extended - Both Eyes; Future  -     Posterior Segment OCT Optic Nerve- Both eyes; Future    IIH (idiopathic intracranial hypertension)  -     Leon Visual Field - OU - Extended - Both Eyes; Future  -     Posterior Segment OCT Optic Nerve- Both eyes; Future    Migraine without aura and without status migrainosus, not intractable  -     Leon Visual Field - OU - Extended - Both Eyes; Future  -     Posterior Segment OCT Optic Nerve- Both eyes; Future      Although the visual fields have been stable in both eyes over the past few weeks, her OCT tests show reduced optic disc edema on both sides. She apears more affected in her left optic nerve than in her right. I will repeat her exam and special testing in one month.        Below you will find my full exam findings. If you have questions, please do not hesitate to call me. I look forward to following Ms. Malinda Brady along with you.    Sincerely,          Art Kelsey MD       CC  Kika Najera NP             Base Eye Exam       Visual Acuity (Snellen - Linear)         Right Left    Dist sc 20/200 20/100    Dist ph sc 20/80 -1 20/60 -1              Pupils         Dark Light Shape React APD    Right 5 3 Round Slow +1    Left    Sluggish +3              Visual Fields    See HVF report.             Extraocular Movement         Right Left     Full, Ortho Full, Ortho              Neuro/Psych   Condition:: Swelling (Left Upper Lip)      Oriented x3: Yes    Mood/Affect: Normal                  Slit Lamp and Fundus Exam       External Exam         Right Left    External Normal Normal              Slit Lamp Exam         Right Left    Lids/Lashes Normal Normal    Conjunctiva/Sclera White and quiet White and quiet    Cornea Clear Clear    Anterior Chamber Deep and quiet Deep and quiet    Iris Round and reactive Round and reactive    Lens Clear Clear    Vitreous Normal Normal              Fundus Exam         Right Left    Disc Grade 2 with 2+ edema and pallor Grade 2 with 2+ edema and pallor    Macula Normal Normal    Vessels Tortuous Tortuous    Periphery Normal Normal                      Please Describe Your Condition:: comes in for a chief complaint of Swelling (Left Upper Lip). x 3 days.

## 2023-11-14 ENCOUNTER — OFFICE VISIT (OUTPATIENT)
Dept: NEUROLOGY | Facility: CLINIC | Age: 23
End: 2023-11-14
Payer: COMMERCIAL

## 2023-11-14 ENCOUNTER — TELEPHONE (OUTPATIENT)
Dept: NEUROLOGY | Facility: CLINIC | Age: 23
End: 2023-11-14

## 2023-11-14 VITALS
BODY MASS INDEX: 44.57 KG/M2 | HEART RATE: 79 BPM | TEMPERATURE: 97 F | WEIGHT: 284 LBS | RESPIRATION RATE: 17 BRPM | SYSTOLIC BLOOD PRESSURE: 138 MMHG | HEIGHT: 67 IN | DIASTOLIC BLOOD PRESSURE: 89 MMHG

## 2023-11-14 DIAGNOSIS — H53.9 VISION CHANGES: ICD-10-CM

## 2023-11-14 DIAGNOSIS — H47.10 PAPILLEDEMA: ICD-10-CM

## 2023-11-14 DIAGNOSIS — G43.009 MIGRAINE WITHOUT AURA AND WITHOUT STATUS MIGRAINOSUS, NOT INTRACTABLE: Primary | ICD-10-CM

## 2023-11-14 PROCEDURE — 3008F BODY MASS INDEX DOCD: CPT | Mod: CPTII,S$GLB,, | Performed by: NURSE PRACTITIONER

## 2023-11-14 PROCEDURE — 99214 OFFICE O/P EST MOD 30 MIN: CPT | Mod: S$GLB,,, | Performed by: NURSE PRACTITIONER

## 2023-11-14 PROCEDURE — 1160F PR REVIEW ALL MEDS BY PRESCRIBER/CLIN PHARMACIST DOCUMENTED: ICD-10-PCS | Mod: CPTII,S$GLB,, | Performed by: NURSE PRACTITIONER

## 2023-11-14 PROCEDURE — 1159F PR MEDICATION LIST DOCUMENTED IN MEDICAL RECORD: ICD-10-PCS | Mod: CPTII,S$GLB,, | Performed by: NURSE PRACTITIONER

## 2023-11-14 PROCEDURE — 3008F PR BODY MASS INDEX (BMI) DOCUMENTED: ICD-10-PCS | Mod: CPTII,S$GLB,, | Performed by: NURSE PRACTITIONER

## 2023-11-14 PROCEDURE — 99999 PR PBB SHADOW E&M-EST. PATIENT-LVL IV: ICD-10-PCS | Mod: PBBFAC,,, | Performed by: NURSE PRACTITIONER

## 2023-11-14 PROCEDURE — 3079F PR MOST RECENT DIASTOLIC BLOOD PRESSURE 80-89 MM HG: ICD-10-PCS | Mod: CPTII,S$GLB,, | Performed by: NURSE PRACTITIONER

## 2023-11-14 PROCEDURE — 3079F DIAST BP 80-89 MM HG: CPT | Mod: CPTII,S$GLB,, | Performed by: NURSE PRACTITIONER

## 2023-11-14 PROCEDURE — 1160F RVW MEDS BY RX/DR IN RCRD: CPT | Mod: CPTII,S$GLB,, | Performed by: NURSE PRACTITIONER

## 2023-11-14 PROCEDURE — 99214 PR OFFICE/OUTPT VISIT, EST, LEVL IV, 30-39 MIN: ICD-10-PCS | Mod: S$GLB,,, | Performed by: NURSE PRACTITIONER

## 2023-11-14 PROCEDURE — 3075F PR MOST RECENT SYSTOLIC BLOOD PRESS GE 130-139MM HG: ICD-10-PCS | Mod: CPTII,S$GLB,, | Performed by: NURSE PRACTITIONER

## 2023-11-14 PROCEDURE — 1159F MED LIST DOCD IN RCRD: CPT | Mod: CPTII,S$GLB,, | Performed by: NURSE PRACTITIONER

## 2023-11-14 PROCEDURE — 3044F HG A1C LEVEL LT 7.0%: CPT | Mod: CPTII,S$GLB,, | Performed by: NURSE PRACTITIONER

## 2023-11-14 PROCEDURE — 3075F SYST BP GE 130 - 139MM HG: CPT | Mod: CPTII,S$GLB,, | Performed by: NURSE PRACTITIONER

## 2023-11-14 PROCEDURE — 3044F PR MOST RECENT HEMOGLOBIN A1C LEVEL <7.0%: ICD-10-PCS | Mod: CPTII,S$GLB,, | Performed by: NURSE PRACTITIONER

## 2023-11-14 PROCEDURE — 99999 PR PBB SHADOW E&M-EST. PATIENT-LVL IV: CPT | Mod: PBBFAC,,, | Performed by: NURSE PRACTITIONER

## 2023-11-14 RX ORDER — RIMEGEPANT SULFATE 75 MG/75MG
75 TABLET, ORALLY DISINTEGRATING ORAL DAILY PRN
Qty: 16 TABLET | Refills: 11 | Status: SHIPPED | OUTPATIENT
Start: 2023-11-14

## 2023-11-14 NOTE — PROGRESS NOTES
Date of service: 11/14/2023  Referring provider: No ref. provider found    Subjective:      Chief complaint: Headache       Patient ID: Malinda Brady is a 23 y.o. female who presents for follow up of headache     History of Present Illness    INTERVAL HISTORY 11/14/23    Last visit was about two months ago and at that time she was better. Plan was to start Topamax. Since last visit she has seen vascular neuro who has put stents in. She saw Dr. Kelsey as well. Vision improving since stent. Dr. Kelsey noted +2 edema.     Today she reports she is doing well. She has had no headaches since surgery. Current pain 0 with range 0-2. She has taken rizatriptan maybe once. Otherwise information below is reviewed and verified with no changes made     INTERVAL HISTORY 9/22/23    Last visit was one month ago and at that time I referred to ophthalmology for evaluation of IIH and sleep study. We started frovatriptan. I ordered MRI and MRV which was done at [facility].    Today she reports she is better. Vision is slightly less blurry and headaches are less frequent. They occur in the back of the head. Current pain 2 with range 2-8. She has 5 headache days per week. She takes compazine near daily. Of note, she has stopped smoking which helped her vision some. But her peripheral vision is still poor. Frovatriptan makes her headaches worse. Otherwise information below is reviewed and verified with no changes made     ORIGINAL HEADACHE HISTORY - 8/24/23  Age at onset and course over time: 8/14/23 began with migraine that has not broken. Went to ER. Currently on steroids (started yesterday).  Began with migraine headaches around age 16 with menstrual cycle. Treated with OTC.    Family  history of migraines - sister, mom  Family history of aneurysm - none  Last eye exam - over 10 years ago    No recent high dose vitamin A or tetracycline use. Steady weight, no changes for some time. She has had some vision changes in the past month. She has  poor vision more so when tired. Left eye blurry.     Location: varies   Quality:  [x] pressure [] tight [x] throbbing [] sharp [] stabbing   Severity: current 6 with range 3-8  Duration: days  Frequency: daily since 8/14  Headaches awaken at night?:  yes  Worst time of day: upon waking   Associated with: [x] photophobia [x]  phonophobia [] osmophobia [] blurred vision  [] double vision [x] loss of appetite [x] nausea [x] vomiting [x] dizziness [] vertigo  [x] tinnitus [] irritability [] sinus pressure [] problems with concentration   [x] neck tightness   Alleviated by:  [x] sleep [x] darkness [] massage [] heat [x] ice [x] medication  Exacerbated by:  [x] fatigue [x] light [x] noise [] smells [] coughing [] sneezing  [x] bending over [x] ovulation [] menses [] alcohol [] change in weather [x]  stress  Ipsilateral autonomic: [] nasal congestion [] lacrimation [] ptosis [] injection [] edema [] foreign body sensation [] ear fullness   ICP:  [] transient visual obscurations  [x] tinnitus pulsatile  [] positional headache  [x] non-positional   Sleep habits: trouble staying asleep, unrefreshing sleep, snoring   Caffeine intake: 16 oz  Gyn status (if female): having periods, uses condoms    HIT 6: 64    Current acute treatment:  Zofran  Rizatriptan   Compazine  Tizanidine    Current prevention:  Magnesium   Topamax - 50 mg BID    Previously tried/failed acute treatment:  Ibuprofen  Tylenol  Excedrin  Frovatriptan - worsens headaches     Previously tried/failed preventative treatment:  None     Review of patient's allergies indicates:  No Known Allergies  Current Outpatient Medications   Medication Sig Dispense Refill    aspirin 81 MG Chew Chew and swallow 1 tablet (81 mg total) by mouth once daily. 30 tablet 11    clopidogreL (PLAVIX) 75 mg tablet Take 1 tablet (75 mg total) by mouth once daily. 30 tablet 11    rizatriptan (MAXALT) 10 MG tablet 1 tab PO PRN migraine. May repeat every 2 hours for max 3 tabs in 24 hours.  Use no more than 10 days per month. 9 tablet 11    tiZANidine (ZANAFLEX) 4 MG tablet Half or full tablet by mouth at night as needed for muscle spasm 30 tablet 11    topiramate (TOPAMAX) 50 MG tablet Take 1 tablet (50 mg total) by mouth 2 (two) times daily. (Patient taking differently: Take 50 mg by mouth nightly.) 60 tablet 11    rimegepant (NURTEC) 75 mg odt Take 1 tablet (75 mg total) by mouth daily as needed for Migraine. Place ODT tablet on the tongue; alternatively the ODT tablet may be placed under the tongue 16 tablet 11     No current facility-administered medications for this visit.       Past Medical History  Past Medical History:   Diagnosis Date    Acne     Menarche     Age of onset 10       Past Surgical History  Past Surgical History:   Procedure Laterality Date    TONSILLECTOMY      Tonsils         Family History  Family History   Problem Relation Age of Onset    Acne Brother     Breast cancer Paternal Grandmother     Migraines Father     Arthritis Mother     Migraines Sister     No Known Problems Sister     Colon cancer Neg Hx     Diabetes Neg Hx     Hypertension Neg Hx     Ovarian cancer Neg Hx     Melanoma Neg Hx     Lupus Neg Hx     Psoriasis Neg Hx     Eczema Neg Hx     Cancer Neg Hx        Social History  Social History     Socioeconomic History    Marital status: Single   Occupational History    Occupation: Student   Tobacco Use    Smoking status: Never    Smokeless tobacco: Never   Substance and Sexual Activity    Alcohol use: No    Drug use: No    Sexual activity: Yes     Birth control/protection: Condom   Other Topics Concern    Are you pregnant or think you may be? No    Breast-feeding No   Social History Narrative    Lives in a dorm room at school and comes home to both parents during her breaks    2 dog     3 siblings    1st year at [employer]     Social Determinants of Health     Financial Resource Strain: Low Risk  (10/10/2023)    Overall Financial Resource Strain (CARDIA)      Difficulty of Paying Living Expenses: Not hard at all   Food Insecurity: No Food Insecurity (10/10/2023)    Hunger Vital Sign     Worried About Running Out of Food in the Last Year: Never true     Ran Out of Food in the Last Year: Never true   Transportation Needs: No Transportation Needs (10/10/2023)    PRAPARE - Transportation     Lack of Transportation (Medical): No     Lack of Transportation (Non-Medical): No   Physical Activity: Inactive (10/10/2023)    Exercise Vital Sign     Days of Exercise per Week: 0 days     Minutes of Exercise per Session: 0 min   Stress: No Stress Concern Present (10/10/2023)    [place] [employer] - Occupational Stress Questionnaire     Feeling of Stress : Not at all   Social Connections: Moderately Integrated (10/10/2023)    Social Connection and Isolation Panel [NHANES]     Frequency of Communication with Friends and Family: More than three times a week     Frequency of Social Gatherings with Friends and Family: Three times a week     Attends Baptism Services: 1 to 4 times per year     Active Member of Clubs or Organizations: No     Attends Club or Organization Meetings: Never     Marital Status: Living with partner   Housing Stability: Low Risk  (10/10/2023)    Housing Stability Vital Sign     Unable to Pay for Housing in the Last Year: No     Number of Places Lived in the Last Year: 2     Unstable Housing in the Last Year: No          Objective:        Vitals:    11/14/23 1426   BP: 138/89   Pulse: 79   Resp: 17   Temp: 97.3 °F (36.3 °C)         Body mass index is 44.48 kg/m².    11/14/23  Constitutional:   She appears well-developed and well-nourished. She is well groomed     Neurological Exam:  General: well-developed, well-nourished, no distress  Mental status: Awake and alert  Speech language: No dysarthria or aphasia on conversation  Cranial nerves: Face symmetric  Motor: Moves all extremities well  Coordination: No ataxia. No  tremor.    8/24/23  Constitutional: appears in no acute distress, well-developed, well-nourished     Eyes: normal conjunctiva, PERRLA    Ears, nose, mouth, throat: external appearance of ears and nose normal, hearing intact     Cardiovascular: regular rate and rhythm, no murmurs appreciated    Respiratory: unlabored respirations, breath sounds normal bilaterally    Gastrointestinal: no visible abdominal masses, no guarding, no visible hernia    Musculoskeletal: normal tone in all four extremities. No abnormal movements. No pronator drift. No orbit. Symmetric finger tapping. Normal station. Normal regular gait. Normal tandem gait.      Spine:   CERVICAL SPINE:  ROM: mildly restricted    MUSCLE SPASM: no   FACET LOADING: no   SPURLING: no  JORDAN / PAMELA tender: no     Psychiatric: normal judgment and insight. Oriented to person, place, and time.     Neurologic:   Cortical functions: recent and remote memory intact, normal attention span and concentration, speech fluent, adequate fund of knowledge   Cranial nerves: visual fields full, PERRLA, EOMI, symmetric facial strength, hearing intact, palate elevates symmetrically, shoulder shrug 5/5, tongue protrudes midline   Reflexes: 2+ in the upper and lower extremities, no Lyons  Sensation: intact to temperature throughout   Coordination: normal finger to nose, heel to shin    Data Review:     I have personally reviewed the referring provider's notes, labs, & imaging made available to me today.      RADIOLOGY STUDIES:  I have personally reviewed the pertinent images performed.       Results for orders placed or performed during the hospital encounter of 08/22/23   CT Head Without Contrast    Narrative    EXAMINATION:  CT HEAD WITHOUT CONTRAST    CLINICAL HISTORY:  Headache, chronic, new features or increased frequency;    TECHNIQUE:  Low dose axial images were obtained through the head.  Coronal and sagittal reformations were also performed. Contrast was not  administered.    Automated exposure control radiation dose lowering technique was utilized.  The DLP is 549.    COMPARISON:  None.    FINDINGS:  Brain is normally formed.  There is no hydrocephalus or any abnormal extra-axial fluid collections.  No acute intracranial hemorrhages.  Within the supratentorial cerebral hemispheres the gray/white matter delineation is preserved.  No abnormal mass effects or midline shift or herniations.  There is no parenchymal hemorrhages.  In the posterior fossa the 4th ventricle is in midline.  No cerebellar hemorrhages or signs for cerebellar infarctions.    There is a borderline enlargement of the sella turcica with a partially empty sella.  This finding can sometimes be seen in patients with chronic intracranial hypertension.    Visualized ocular globes demonstrate no gross abnormalities.  Visualized paranasal air sinuses are clear.  Normal pneumatization of the mastoids.  Cranial vault fractures or osteoblastic or lytic lesions.      Impression    1. No acute intracranial processes.  2. Borderline enlargement of the sella turcica with a partially empty sella as above.      Electronically signed by: Nick Bee MD  Date:    08/22/2023  Time:    11:59       Lab Results   Component Value Date     10/10/2023    K 4.1 10/10/2023    MG 1.9 10/10/2023     (H) 10/10/2023    CO2 17 (L) 10/10/2023    BUN 9 10/10/2023    CREATININE 0.8 10/10/2023     (H) 10/10/2023    HGBA1C 4.8 10/09/2023    AST 18 10/10/2023    ALT 40 10/10/2023    ALBUMIN 3.6 10/10/2023    PROT 6.8 10/10/2023    BILITOT 0.6 10/10/2023    CHOL 128 10/09/2023    HDL 26 (L) 10/09/2023    LDLCALC 88.4 10/09/2023    TRIG 68 10/09/2023       Lab Results   Component Value Date    WBC 15.18 (H) 10/10/2023    HGB 15.2 10/10/2023    HCT 42.1 10/10/2023    MCV 86 10/10/2023     10/10/2023       Lab Results   Component Value Date    TSH 1.016 10/09/2023           Assessment & Plan:       Problem List Items  Addressed This Visit          Neuro    Migraine without aura and without status migrainosus, not intractable - Primary (Chronic)    Overview     Migraine onset August 2023. Headaches are typically unilateral, moderate to severe in intensity, worsen with activity, pounding in quality and associated with sensitivity to light and sound. Add gepant.            Current Assessment & Plan     Significant improvement in migraines with Topamax and venous stenting. No migraines in two months. Continue current plan.          Relevant Medications    rimegepant (NURTEC) 75 mg odt       Ophtho    Papilledema    Current Assessment & Plan     2+ papilledema in October with Dr. Kelsey. Follow up in December.  Encouraged weight loss.           Other Visit Diagnoses       Vision changes        Improved after venous stent                    Please call our clinic at [phone] or send a message on the [employer] portal if there are any changes to the plan described below, for example,if you are not contacted for the requested tests, referral(s) within one week, if you are unable to receive the medications prescribed, or if you feel you need to change the treatment course for any reason.     TESTING:  -- none    REFERRALS:  -- none     PREVENTION (use daily regardless of headache):  -- continue magnesium in ONE of the following preparations -               1. Magnesium oxide 800mg daily (the most common over the counter kind, may causes loose stools)              2. Magnesium citrate 400-500mg daily (harder to find, but more neutral on the bowels)              3. Magnesium glycinate 400mg daily (hardest to find, look online, but most bowel-neutral, best absorbed)   -- continue topiramate    AS-NEEDED TREATMENT (use total no more than 10 days per month unless otherwise stated):  -- continue rizatriptan with next escalation to migraine. You can repeat two hours later if needed   -- continue tizanidine at night. This is a muscle relaxer and  it will also make you potentially sleepy. Start with half a tablet to see how you respond but can take up to a whole tablet if needed  -- continue compazine. This is a nausea medication that also has anti-migraine properties. Can take daily and will not contribute to rebound headaches  -- START Nurtec with next migraine. This dissolves under the tongue and is only taken once in 24 hour time frame.        Follow up in about 3 months (around 2/14/2024).    Kika Najera, NP

## 2023-11-14 NOTE — PATIENT INSTRUCTIONS
Please call our clinic at [phone] or send a message on the [employer] portal if there are any changes to the plan described below, for example,if you are not contacted for the requested tests, referral(s) within one week, if you are unable to receive the medications prescribed, or if you feel you need to change the treatment course for any reason.     TESTING:  -- none    REFERRALS:  -- none     PREVENTION (use daily regardless of headache):  -- continue magnesium in ONE of the following preparations -               1. Magnesium oxide 800mg daily (the most common over the counter kind, may causes loose stools)              2. Magnesium citrate 400-500mg daily (harder to find, but more neutral on the bowels)              3. Magnesium glycinate 400mg daily (hardest to find, look online, but most bowel-neutral, best absorbed)   -- continue topiramate    AS-NEEDED TREATMENT (use total no more than 10 days per month unless otherwise stated):  -- continue rizatriptan with next escalation to migraine. You can repeat two hours later if needed   -- continue tizanidine at night. This is a muscle relaxer and it will also make you potentially sleepy. Start with half a tablet to see how you respond but can take up to a whole tablet if needed  -- continue compazine. This is a nausea medication that also has anti-migraine properties. Can take daily and will not contribute to rebound headaches  -- START Nurtec with next migraine. This dissolves under the tongue and is only taken once in 24 hour time frame.

## 2023-11-14 NOTE — ASSESSMENT & PLAN NOTE
Significant improvement in migraines with Topamax and venous stenting. No migraines in two months. Continue current plan.

## 2023-12-06 ENCOUNTER — CLINICAL SUPPORT (OUTPATIENT)
Dept: OPHTHALMOLOGY | Facility: CLINIC | Age: 23
End: 2023-12-06
Payer: COMMERCIAL

## 2023-12-15 ENCOUNTER — PATIENT MESSAGE (OUTPATIENT)
Dept: FAMILY MEDICINE | Facility: CLINIC | Age: 23
End: 2023-12-15
Payer: COMMERCIAL

## 2024-02-19 ENCOUNTER — OFFICE VISIT (OUTPATIENT)
Dept: NEUROLOGY | Facility: CLINIC | Age: 24
End: 2024-02-19
Payer: COMMERCIAL

## 2024-02-19 VITALS
HEART RATE: 118 BPM | SYSTOLIC BLOOD PRESSURE: 129 MMHG | BODY MASS INDEX: 45.99 KG/M2 | DIASTOLIC BLOOD PRESSURE: 89 MMHG | RESPIRATION RATE: 18 BRPM | WEIGHT: 293 LBS | HEIGHT: 67 IN

## 2024-02-19 DIAGNOSIS — E66.01 MORBID OBESITY WITH BMI OF 40.0-44.9, ADULT: ICD-10-CM

## 2024-02-19 DIAGNOSIS — G43.009 MIGRAINE WITHOUT AURA AND WITHOUT STATUS MIGRAINOSUS, NOT INTRACTABLE: Primary | Chronic | ICD-10-CM

## 2024-02-19 DIAGNOSIS — G93.2 IIH (IDIOPATHIC INTRACRANIAL HYPERTENSION): ICD-10-CM

## 2024-02-19 DIAGNOSIS — H47.10 PAPILLEDEMA: ICD-10-CM

## 2024-02-19 PROCEDURE — 99999 PR PBB SHADOW E&M-EST. PATIENT-LVL IV: CPT | Mod: PBBFAC,,, | Performed by: NURSE PRACTITIONER

## 2024-02-19 PROCEDURE — 99214 OFFICE O/P EST MOD 30 MIN: CPT | Mod: S$GLB,,, | Performed by: NURSE PRACTITIONER

## 2024-02-19 NOTE — PROGRESS NOTES
Date of service: 2/19/2024  Referring provider: No ref. provider found    Subjective:      Chief complaint: Migraine       Patient ID: Malinda Brady is a 24 y.o. female who presents for follow up of headache     History of Present Illness    INTERVAL HISTORY 2/19/24    Last visit was three months ago and at that time she was doing well.    Today she reports she is better. Current pain 0 with range 0-5. She has rare migraines. She takes rizatriptan and Nurtec as needed. Her vision has improved. She saw Dr. Kelsey in December who noted +2 edema again. Otherwise information below is reviewed and verified with no changes made     INTERVAL HISTORY 11/14/23    Last visit was about two months ago and at that time she was better. Plan was to start Topamax. Since last visit she has seen vascular neuro who has put stents in. She saw Dr. Kelsey as well. Vision improving since stent. Dr. Kelsey noted +2 edema.     Today she reports she is doing well. She has had no headaches since surgery. Current pain 0 with range 0-2. She has taken rizatriptan maybe once. Otherwise information below is reviewed and verified with no changes made     INTERVAL HISTORY 9/22/23    Last visit was one month ago and at that time I referred to ophthalmology for evaluation of IIH and sleep study. We started frovatriptan. I ordered MRI and MRV which was done at [facility].    Today she reports she is better. Vision is slightly less blurry and headaches are less frequent. They occur in the back of the head. Current pain 2 with range 2-8. She has 5 headache days per week. She takes compazine near daily. Of note, she has stopped smoking which helped her vision some. But her peripheral vision is still poor. Frovatriptan makes her headaches worse. Otherwise information below is reviewed and verified with no changes made     ORIGINAL HEADACHE HISTORY - 8/24/23  Age at onset and course over time: 8/14/23 began with migraine that has not broken. Went to ER.  Currently on steroids (started yesterday).  Began with migraine headaches around age 16 with menstrual cycle. Treated with OTC.    Family  history of migraines - sister, mom  Family history of aneurysm - none  Last eye exam - over 10 years ago    No recent high dose vitamin A or tetracycline use. Steady weight, no changes for some time. She has had some vision changes in the past month. She has poor vision more so when tired. Left eye blurry.     Location: varies   Quality:  [x] pressure [] tight [x] throbbing [] sharp [] stabbing   Severity: current 6 with range 3-8  Duration: days  Frequency: daily since 8/14  Headaches awaken at night?:  yes  Worst time of day: upon waking   Associated with: [x] photophobia [x]  phonophobia [] osmophobia [] blurred vision  [] double vision [x] loss of appetite [x] nausea [x] vomiting [x] dizziness [] vertigo  [x] tinnitus [] irritability [] sinus pressure [] problems with concentration   [x] neck tightness   Alleviated by:  [x] sleep [x] darkness [] massage [] heat [x] ice [x] medication  Exacerbated by:  [x] fatigue [x] light [x] noise [] smells [] coughing [] sneezing  [x] bending over [x] ovulation [] menses [] alcohol [] change in weather [x]  stress  Ipsilateral autonomic: [] nasal congestion [] lacrimation [] ptosis [] injection [] edema [] foreign body sensation [] ear fullness   ICP:  [] transient visual obscurations  [x] tinnitus pulsatile  [] positional headache  [x] non-positional   Sleep habits: trouble staying asleep, unrefreshing sleep, snoring   Caffeine intake: 16 oz  Gyn status (if female): having periods, uses condoms    HIT 6: 64    Current acute treatment:  Zofran  Rizatriptan   Compazine  Tizanidine    Current prevention:  Magnesium   Topamax - 50 mg BID    Previously tried/failed acute treatment:  Ibuprofen  Tylenol  Excedrin  Frovatriptan - worsens headaches     Previously tried/failed preventative treatment:  None     Review of patient's allergies  indicates:  No Known Allergies  Current Outpatient Medications   Medication Sig Dispense Refill    aspirin 81 MG Chew Chew and swallow 1 tablet (81 mg total) by mouth once daily. 30 tablet 11    clopidogreL (PLAVIX) 75 mg tablet Take 1 tablet (75 mg total) by mouth once daily. 30 tablet 11    rimegepant (NURTEC) 75 mg odt Take 1 tablet (75 mg total) by mouth daily as needed for Migraine. Place ODT tablet on the tongue; alternatively the ODT tablet may be placed under the tongue 16 tablet 11    rizatriptan (MAXALT) 10 MG tablet 1 tab PO PRN migraine. May repeat every 2 hours for max 3 tabs in 24 hours. Use no more than 10 days per month. 9 tablet 11    tiZANidine (ZANAFLEX) 4 MG tablet Half or full tablet by mouth at night as needed for muscle spasm 30 tablet 11    topiramate (TOPAMAX) 50 MG tablet Take 1 tablet (50 mg total) by mouth 2 (two) times daily. (Patient taking differently: Take 50 mg by mouth nightly.) 60 tablet 11     No current facility-administered medications for this visit.       Past Medical History  Past Medical History:   Diagnosis Date    Acne     Menarche     Age of onset 10       Past Surgical History  Past Surgical History:   Procedure Laterality Date    TONSILLECTOMY      Tonsils         Family History  Family History   Problem Relation Age of Onset    Acne Brother     Breast cancer Paternal Grandmother     Migraines Father     Arthritis Mother     Migraines Sister     No Known Problems Sister     Colon cancer Neg Hx     Diabetes Neg Hx     Hypertension Neg Hx     Ovarian cancer Neg Hx     Melanoma Neg Hx     Lupus Neg Hx     Psoriasis Neg Hx     Eczema Neg Hx     Cancer Neg Hx        Social History  Social History     Socioeconomic History    Marital status: Single   Occupational History    Occupation: Student   Tobacco Use    Smoking status: Never    Smokeless tobacco: Never   Substance and Sexual Activity    Alcohol use: No    Drug use: No    Sexual activity: Yes     Birth  control/protection: Condom   Other Topics Concern    Are you pregnant or think you may be? No    Breast-feeding No   Social History Narrative    Lives in a dorm room at school and comes home to both parents during her breaks    2 dog     3 siblings    1st year at [employer]     Social Determinants of Health     Financial Resource Strain: Low Risk  (10/10/2023)    Overall Financial Resource Strain (CARDIA)     Difficulty of Paying Living Expenses: Not hard at all   Food Insecurity: No Food Insecurity (10/10/2023)    Hunger Vital Sign     Worried About Running Out of Food in the Last Year: Never true     Ran Out of Food in the Last Year: Never true   Transportation Needs: No Transportation Needs (10/10/2023)    PRAPARE - Transportation     Lack of Transportation (Medical): No     Lack of Transportation (Non-Medical): No   Physical Activity: Inactive (10/10/2023)    Exercise Vital Sign     Days of Exercise per Week: 0 days     Minutes of Exercise per Session: 0 min   Stress: No Stress Concern Present (10/10/2023)    [employer] [place] [employer] - Occupational Stress Questionnaire     Feeling of Stress : Not at all   Social Connections: Moderately Integrated (10/10/2023)    Social Connection and Isolation Panel [NHANES]     Frequency of Communication with Friends and Family: More than three times a week     Frequency of Social Gatherings with Friends and Family: Three times a week     Attends Sikhism Services: 1 to 4 times per year     Active Member of Clubs or Organizations: No     Attends Club or Organization Meetings: Never     Marital Status: Living with partner   Housing Stability: Low Risk  (10/10/2023)    Housing Stability Vital Sign     Unable to Pay for Housing in the Last Year: No     Number of Places Lived in the Last Year: 2     Unstable Housing in the Last Year: No          Objective:        Vitals:    02/19/24 1400   BP: 129/89   Pulse: (!) 118   Resp: 18           Body mass index is  47.81 kg/m².    2/19/24  Constitutional:   She appears well-developed and well-nourished. She is well groomed     Neurological Exam:  General: well-developed, well-nourished, no distress  Mental status: Awake and alert  Speech language: No dysarthria or aphasia on conversation  Cranial nerves: Face symmetric  Motor: Moves all extremities well  Coordination: No ataxia. No tremor.    8/24/23  Constitutional: appears in no acute distress, well-developed, well-nourished     Eyes: normal conjunctiva, PERRLA    Ears, nose, mouth, throat: external appearance of ears and nose normal, hearing intact     Cardiovascular: regular rate and rhythm, no murmurs appreciated    Respiratory: unlabored respirations, breath sounds normal bilaterally    Gastrointestinal: no visible abdominal masses, no guarding, no visible hernia    Musculoskeletal: normal tone in all four extremities. No abnormal movements. No pronator drift. No orbit. Symmetric finger tapping. Normal station. Normal regular gait. Normal tandem gait.      Spine:   CERVICAL SPINE:  ROM: mildly restricted    MUSCLE SPASM: no   FACET LOADING: no   SPURLING: no  JORDAN / PAMELA tender: no     Psychiatric: normal judgment and insight. Oriented to person, place, and time.     Neurologic:   Cortical functions: recent and remote memory intact, normal attention span and concentration, speech fluent, adequate fund of knowledge   Cranial nerves: visual fields full, PERRLA, EOMI, symmetric facial strength, hearing intact, palate elevates symmetrically, shoulder shrug 5/5, tongue protrudes midline   Reflexes: 2+ in the upper and lower extremities, no Lyons  Sensation: intact to temperature throughout   Coordination: normal finger to nose, heel to shin    Data Review:     I have personally reviewed the referring provider's notes, labs, & imaging made available to me today.      RADIOLOGY STUDIES:  I have personally reviewed the pertinent images performed.       Results for orders placed  or performed during the hospital encounter of 08/22/23   CT Head Without Contrast    Narrative    EXAMINATION:  CT HEAD WITHOUT CONTRAST    CLINICAL HISTORY:  Headache, chronic, new features or increased frequency;    TECHNIQUE:  Low dose axial images were obtained through the head.  Coronal and sagittal reformations were also performed. Contrast was not administered.    Automated exposure control radiation dose lowering technique was utilized.  The DLP is 549.    COMPARISON:  None.    FINDINGS:  Brain is normally formed.  There is no hydrocephalus or any abnormal extra-axial fluid collections.  No acute intracranial hemorrhages.  Within the supratentorial cerebral hemispheres the gray/white matter delineation is preserved.  No abnormal mass effects or midline shift or herniations.  There is no parenchymal hemorrhages.  In the posterior fossa the 4th ventricle is in midline.  No cerebellar hemorrhages or signs for cerebellar infarctions.    There is a borderline enlargement of the sella turcica with a partially empty sella.  This finding can sometimes be seen in patients with chronic intracranial hypertension.    Visualized ocular globes demonstrate no gross abnormalities.  Visualized paranasal air sinuses are clear.  Normal pneumatization of the mastoids.  Cranial vault fractures or osteoblastic or lytic lesions.      Impression    1. No acute intracranial processes.  2. Borderline enlargement of the sella turcica with a partially empty sella as above.      Electronically signed by: Nick Bee MD  Date:    08/22/2023  Time:    11:59       Lab Results   Component Value Date     10/10/2023    K 4.1 10/10/2023    MG 1.9 10/10/2023     (H) 10/10/2023    CO2 17 (L) 10/10/2023    BUN 9 10/10/2023    CREATININE 0.8 10/10/2023     (H) 10/10/2023    HGBA1C 4.8 10/09/2023    AST 18 10/10/2023    ALT 40 10/10/2023    ALBUMIN 3.6 10/10/2023    PROT 6.8 10/10/2023    BILITOT 0.6 10/10/2023    CHOL 128  10/09/2023    HDL 26 (L) 10/09/2023    LDLCALC 88.4 10/09/2023    TRIG 68 10/09/2023       Lab Results   Component Value Date    WBC 15.18 (H) 10/10/2023    HGB 15.2 10/10/2023    HCT 42.1 10/10/2023    MCV 86 10/10/2023     10/10/2023       Lab Results   Component Value Date    TSH 1.016 10/09/2023           Assessment & Plan:       Problem List Items Addressed This Visit          Neuro    Migraine without aura and without status migrainosus, not intractable - Primary (Chronic)    Overview     Migraine onset August 2023. Headaches are typically unilateral, moderate to severe in intensity, worsen with activity, pounding in quality and associated with sensitivity to light and sound.   She has done very well with Topamax. Rare migraines.  Continue gepant.            IIH (idiopathic intracranial hypertension)    Current Assessment & Plan     Weight at diagnosis 276 lb  Weight today 305 lbs  Discussed weight loss (20%) as curative for papilledema. No hormones, no vitamin A and no significant sleep apnea as possible contributing factors.          Relevant Orders    Ambulatory consult to Bariatric Medicine       Ophtho    Papilledema    Current Assessment & Plan     Has follow up with Dr. Pardo in one month. If papilledema still present, will likely need diamox          Relevant Orders    Ambulatory consult to Bariatric Medicine       Endocrine    Morbid obesity with BMI of 40.0-44.9, adult (Chronic)    Overview     Stable. The patient is asked to make an attempt to improve diet and exercise patterns to aid in medical management of this problem.           Relevant Orders    Ambulatory consult to Bariatric Medicine                 Please call our clinic at [phone] or send a message on the [facility] portal if there are any changes to the plan described below, for example,if you are not contacted for the requested tests, referral(s) within one week, if you are unable to receive the medications prescribed, or if you  feel you need to change the treatment course for any reason.     TESTING:  -- none    REFERRALS:  -- bariatrics      PREVENTION (use daily regardless of headache):  -- continue magnesium in ONE of the following preparations -               1. Magnesium oxide 800mg daily (the most common over the counter kind, may causes loose stools)              2. Magnesium citrate 400-500mg daily (harder to find, but more neutral on the bowels)              3. Magnesium glycinate 400mg daily (hardest to find, look online, but most bowel-neutral, best absorbed)   -- continue topiramate    AS-NEEDED TREATMENT (use total no more than 10 days per month unless otherwise stated):  -- continue rizatriptan with next escalation to migraine. You can repeat two hours later if needed   -- continue tizanidine at night. This is a muscle relaxer and it will also make you potentially sleepy. Start with half a tablet to see how you respond but can take up to a whole tablet if needed  -- continue compazine. This is a nausea medication that also has anti-migraine properties. Can take daily and will not contribute to rebound headaches  -- continue Nurtec with next migraine. This dissolves under the tongue and is only taken once in 24 hour time frame.        Follow up in about 3 months (around 5/19/2024).    Kika Najera NP

## 2024-02-19 NOTE — ASSESSMENT & PLAN NOTE
Weight at diagnosis 276 lb  Weight today 305 lbs  Discussed weight loss (20%) as curative for papilledema. No hormones, no vitamin A and no significant sleep apnea as possible contributing factors.

## 2024-02-19 NOTE — PATIENT INSTRUCTIONS
Please call our clinic at [phone] or send a message on the [facility] portal if there are any changes to the plan described below, for example,if you are not contacted for the requested tests, referral(s) within one week, if you are unable to receive the medications prescribed, or if you feel you need to change the treatment course for any reason.     TESTING:  -- none    REFERRALS:  -- bariatrics      PREVENTION (use daily regardless of headache):  -- continue magnesium in ONE of the following preparations -               1. Magnesium oxide 800mg daily (the most common over the counter kind, may causes loose stools)              2. Magnesium citrate 400-500mg daily (harder to find, but more neutral on the bowels)              3. Magnesium glycinate 400mg daily (hardest to find, look online, but most bowel-neutral, best absorbed)   -- continue topiramate    AS-NEEDED TREATMENT (use total no more than 10 days per month unless otherwise stated):  -- continue rizatriptan with next escalation to migraine. You can repeat two hours later if needed   -- continue tizanidine at night. This is a muscle relaxer and it will also make you potentially sleepy. Start with half a tablet to see how you respond but can take up to a whole tablet if needed  -- continue compazine. This is a nausea medication that also has anti-migraine properties. Can take daily and will not contribute to rebound headaches  -- continue Nurtec with next migraine. This dissolves under the tongue and is only taken once in 24 hour time frame.    If you still have papilledema at next visit, we will add Diamox.

## 2024-03-12 NOTE — PROGRESS NOTES
"    Date:  3/14/2024    ?  Referring Provider:   Art Kelsey MD    Copies of Letters to the Following:   Art Kelsey MD    Chief Complaint:  I saw Malinda Brady at the [facility] for neuro-ophthalmic evaluation.   She is a 24 y.o. 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    History:     HPI    Referred: Dr. Kelsey    23 y/o female present to clinic to reestablish Neuro-ophthalmic care. She   is was previously being treated by Dr. Kelsey. History of chronic cerebral   venous sinus thrombosis and IIH. Pt states that does notices improvement   in peripheral vision. She c/o of light sensitivity. Last eye exam was age   14, she wants an eye exam/ refraction. Occasional headaches. Episodes of   nausea has increased, maybe links to medications side effects. She denies   diplopia, migraines, eye allergies, floaters, and flashes of lights.     Eyemeds  No gtts  Last edited by Jong Duran on 3/14/2024  9:12 AM.        10/9/2023 left transverse sinus stenting  "   Impression:        1. Normal cerebral angiogram.     2. Stenosis of the left transverse sinus treated successfully with stenting."    11/2/2023 Laure:  "Dural Venous Sinus Stenosis  -     Leon Visual Field - OU - Extended - Both Eyes; Future  -     Posterior Segment OCT Optic Nerve- Both eyes; Future     IIH (idiopathic intracranial hypertension)  -     Leon Visual Field - OU - Extended - Both Eyes; Future  -     Posterior Segment OCT Optic Nerve- Both eyes; Future     Migraine without aura and without status migrainosus, not intractable  -     Leon Visual Field - OU - Extended - Both Eyes; Future  -     Posterior Segment OCT Optic Nerve- Both eyes; Future        Although the visual fields have been stable in both eyes over the past few weeks, her OCT tests show reduced optic disc edema on both sides. She " "apears more affected in her left optic nerve than in her right. I will repeat her exam and special testing in one month.      "    10/24/2023 Vascular neuro:  "Patient with a long history of migraine since approximately the age of 16. headaches are typically unilateral, moderate severe intensity, worsened with activity, pounding quality and associated with substantive to light and sound. Sudden worsening of migraine in August.  Patient seen in ER on 08/ 14/23 for migraine  That would not break.  She was prescribed steroids. since ED visit, patient  reports an  new change in  vision specifically  her peripheral vision is "gone" and distal vision is significantly blurry.  Patient was seen by Dr. Cantu on 10/05/2023-note reviewed in epic"Papilledema associated with increased intracranial pressure".     Interval history:    At the time of today's visit, the patient denies new or worsening focal neurologic symptoms concerning for new stroke or TIA.  Patient is doing well overall and recovering well postprocedure..  She reports headaches have improved; decreased in frequency and severity.  Denies any change in vision.  She denies associated vertigo, double vision, focal weakness or numbness, gait imbalance,  or language disturbance.  She remains independent with ADLs.  She is adherent with home medications including aspirin and Plavix.   "  ?  Current Outpatient Medications   Medication Sig Dispense Refill    aspirin 81 MG Chew Chew and swallow 1 tablet (81 mg total) by mouth once daily. (Patient not taking: Reported on 3/14/2024) 30 tablet 11    clopidogreL (PLAVIX) 75 mg tablet Take 1 tablet (75 mg total) by mouth once daily. (Patient not taking: Reported on 3/14/2024) 30 tablet 11    rimegepant (NURTEC) 75 mg odt Take 1 tablet (75 mg total) by mouth daily as needed for Migraine. Place ODT tablet on the tongue; alternatively the ODT tablet may be placed under the tongue (Patient not taking: Reported on 3/14/2024) 16 " tablet 11    rizatriptan (MAXALT) 10 MG tablet 1 tab PO PRN migraine. May repeat every 2 hours for max 3 tabs in 24 hours. Use no more than 10 days per month. (Patient not taking: Reported on 3/14/2024) 9 tablet 11    tiZANidine (ZANAFLEX) 4 MG tablet Half or full tablet by mouth at night as needed for muscle spasm (Patient not taking: Reported on 3/14/2024) 30 tablet 11    topiramate (TOPAMAX) 50 MG tablet Take 1 tablet (50 mg total) by mouth 2 (two) times daily. (Patient not taking: Reported on 3/14/2024) 60 tablet 11     No current facility-administered medications for this visit.     Review of patient's allergies indicates:  No Known Allergies  Past Medical History:   Diagnosis Date    Acne     Menarche     Age of onset 10     Past Surgical History:   Procedure Laterality Date    TONSILLECTOMY      Tonsils       Family History   Problem Relation Age of Onset    Acne Brother     Breast cancer Paternal Grandmother     Migraines Father     Arthritis Mother     Migraines Sister     No Known Problems Sister     Colon cancer Neg Hx     Diabetes Neg Hx     Hypertension Neg Hx     Ovarian cancer Neg Hx     Melanoma Neg Hx     Lupus Neg Hx     Psoriasis Neg Hx     Eczema Neg Hx     Cancer Neg Hx      Social History     Socioeconomic History    Marital status: Single   Occupational History    Occupation: Student   Tobacco Use    Smoking status: Never    Smokeless tobacco: Never   Substance and Sexual Activity    Alcohol use: No    Drug use: No    Sexual activity: Yes     Birth control/protection: Condom   Other Topics Concern    Are you pregnant or think you may be? No    Breast-feeding No   Social History Narrative    Lives in a dorm room at school and comes home to both parents during her breaks    2 dog     3 siblings    1st year at [place]     Social Determinants of Health     Financial Resource Strain: Low Risk  (10/10/2023)    Overall Financial Resource Strain (CARDIA)     Difficulty of Paying Living  Expenses: Not hard at all   Food Insecurity: No Food Insecurity (10/10/2023)    Hunger Vital Sign     Worried About Running Out of Food in the Last Year: Never true     Ran Out of Food in the Last Year: Never true   Transportation Needs: No Transportation Needs (10/10/2023)    PRAPARE - Transportation     Lack of Transportation (Medical): No     Lack of Transportation (Non-Medical): No   Physical Activity: Inactive (10/10/2023)    Exercise Vital Sign     Days of Exercise per Week: 0 days     Minutes of Exercise per Session: 0 min   Stress: No Stress Concern Present (10/10/2023)    [place] [place] [employer] - Occupational Stress Questionnaire     Feeling of Stress : Not at all   Social Connections: Moderately Integrated (10/10/2023)    Social Connection and Isolation Panel [NHANES]     Frequency of Communication with Friends and Family: More than three times a week     Frequency of Social Gatherings with Friends and Family: Three times a week     Attends Anglican Services: 1 to 4 times per year     Active Member of Clubs or Organizations: No     Attends Club or Organization Meetings: Never     Marital Status: Living with partner   Housing Stability: Low Risk  (10/10/2023)    Housing Stability Vital Sign     Unable to Pay for Housing in the Last Year: No     Number of Places Lived in the Last Year: 2     Unstable Housing in the Last Year: No       Examination:  She was well-appearing. She was alert and oriented. Attention span and concentration were normal. Speech, language, memory, and general knowledge were intact.      Her distance visual acuity without correction was 20/200  (PH 20/50) in the right eye and 20/150  in the left eye.  Her near visual acuity without correction was J5 PH J3 in the right eye and J2 PH NI in the left eye.      She perceived 8/8 OD and 8/8 OS Ishihara color plates correctly. Pupils were reactive to light without an afferent defect. Ocular ductions were full.  There was no  nystagmus. Saccades and pursuits were normal. Lids were symmetric.     Sensorimotor Examination    Cross cover at distance  Primary 6XT and flick LHT  Left 4 XT and flick LHT  Right 1 LHT    Optic discs appeared pallorous and flat OD and OS. Pupillary dilation was not necessary for visualization of the optic disc today.     Laboratories Reviewed:     N/a  ?  Neuroimaging Reviewed:     8/2023 CTH wo contrast  Brain is normally formed.  There is no hydrocephalus or any abnormal extra-axial fluid collections.  No acute intracranial hemorrhages.  Within the supratentorial cerebral hemispheres the gray/white matter delineation is preserved.  No abnormal mass effects or midline shift or herniations.  There is no parenchymal hemorrhages.  In the posterior fossa the 4th ventricle is in midline.  No cerebellar hemorrhages or signs for cerebellar infarctions.     There is a borderline enlargement of the sella turcica with a partially empty sella.  This finding can sometimes be seen in patients with chronic intracranial hypertension.     Visualized ocular globes demonstrate no gross abnormalities.  Visualized paranasal air sinuses are clear.  Normal pneumatization of the mastoids.  Cranial vault fractures or osteoblastic or lytic lesions.     Impression:     1. No acute intracranial processes.  2. Borderline enlargement of the sella turcica with a partially empty sella as above.  ?  Ocular Imaging, Photos, Records Reviewed:       OCT RNFL 10/2023:   Right Eye - Average RNFL 214 global elevation   Left Eye - Average RNFL 128 S, I, N elevation     Macular star OD      OCT RNFL Today 3/14/2024:   Right Eye - Average RNFL 58 global thinning with relative temporal sparing   Left Eye - Average RNFL 41 global thinning     Abnormal macular architecture OU    Visual Field Test 24-2 OU Today 3/14/2024: Right Eye - fixation losses 1/14, false positives 15%, false negatives 15%, MD -17.33dB, Impression OD: constriction and BSE. Left Eye -  fixation losses 1/15, false positives 7%, false negatives 50%, MD -21.40dB, Impression OS: constriction, worst SN and BSE.  ?  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 They report sudden onset severe headaches (history of occasional migraines) in 8/2023 with no known precipitating event (illness, rash, fever, travel, new kitten), holocephalic headaches with some mild pulsatile tinnitus and progressive constriction of visual fields. She did not have a lumbar puncture. Imaging revealed some venous sinus stenosis and she had stenting and was started on topiramate 50 mg BID with gradual improvement in headaches and vision. Neuro-ophthalmologic examination was notable for reduced visual acuities OD>OS, full color vision, normal ocular motility and alignment consistent with known history of left lazy eye. OCT with global thinning compared to prior with gradual resolution of severe optic disc edema from 10/2023 on. Formal visual fields were constricted OD and OS, but with improvement from prior. Interestingly, there is evidence of macular star on OCT in 10/2023 which gradually improves over subsequent studies. I would like to obtain neuroretinitis labs today. I recommended to continue the toprimate in case it is having a significant effect on her intracranial pressure.   ?  Plan:  1. Neuroretinitis labs  2. Continue topiramate 50 mg BID  3. Follow up with optometry/ophthalmology for yearly routine eye exams and refraction needs  4. Follow up in vascular neurology clinic as planned    Follow-up:  I will see her in follow-up in 6 months or sooner with any change.  ?OCT, HVF and optos  ?  Visit Checklist (as applicable):  1. Status of new and prior symptoms discussed? yes  2. Neuroimaging reviewed/ ordered as appropriate? yes  3. Ocular imaging and photos reviewed/ ordered as appropriate? yes  4.  Plan for work-up and treatment discussed with patient? yes  5. Potential medication side-effects and monitoring plan discussed? yes  6. Review of outside medical records was performed and pertinent details are summarized in the HPI above? N/a    Time spent on this encounter: 45 minutes. This includes face to face time and non-face to face time preparing to see the patient (eg, review of tests), obtaining and/or reviewing separately obtained history, documenting clinical information in the electronic or other health record, independently interpreting results and communicating results to the patient/family/caregiver, or care coordinator.    Visit today included increased complexity associated with the evaluation and the longitudinal management of the patient due to the serious and complex problem of papilledema requiring episodic surveillance of optic disc appearance, visual function, and formal visual humphrey.      PARAMJIT Deutsch  Neuro-Ophthalmology Consultant

## 2024-03-14 ENCOUNTER — CLINICAL SUPPORT (OUTPATIENT)
Dept: OPHTHALMOLOGY | Facility: CLINIC | Age: 24
End: 2024-03-14
Payer: COMMERCIAL

## 2024-03-14 ENCOUNTER — OFFICE VISIT (OUTPATIENT)
Dept: OPHTHALMOLOGY | Facility: CLINIC | Age: 24
End: 2024-03-14
Payer: COMMERCIAL

## 2024-03-14 ENCOUNTER — LAB VISIT (OUTPATIENT)
Dept: LAB | Facility: HOSPITAL | Age: 24
End: 2024-03-14
Attending: STUDENT IN AN ORGANIZED HEALTH CARE EDUCATION/TRAINING PROGRAM
Payer: COMMERCIAL

## 2024-03-14 DIAGNOSIS — H53.15 VISUAL DISTORTIONS OF SHAPE AND SIZE: ICD-10-CM

## 2024-03-14 DIAGNOSIS — H47.11 PAPILLEDEMA ASSOCIATED WITH INCREASED INTRACRANIAL PRESSURE: ICD-10-CM

## 2024-03-14 DIAGNOSIS — G93.2 IIH (IDIOPATHIC INTRACRANIAL HYPERTENSION): Primary | ICD-10-CM

## 2024-03-14 DIAGNOSIS — G08 CHRONIC CEREBRAL VENOUS SINUS THROMBOSIS: ICD-10-CM

## 2024-03-14 PROBLEM — H47.10 PAPILLEDEMA: Status: RESOLVED | Noted: 2023-10-09 | Resolved: 2024-03-14

## 2024-03-14 PROCEDURE — 86480 TB TEST CELL IMMUN MEASURE: CPT | Performed by: STUDENT IN AN ORGANIZED HEALTH CARE EDUCATION/TRAINING PROGRAM

## 2024-03-14 PROCEDURE — 92083 EXTENDED VISUAL FIELD XM: CPT | Mod: S$GLB,,, | Performed by: STUDENT IN AN ORGANIZED HEALTH CARE EDUCATION/TRAINING PROGRAM

## 2024-03-14 PROCEDURE — 92133 CPTRZD OPH DX IMG PST SGM ON: CPT | Mod: S$GLB,,, | Performed by: STUDENT IN AN ORGANIZED HEALTH CARE EDUCATION/TRAINING PROGRAM

## 2024-03-14 PROCEDURE — 99999 PR PBB SHADOW E&M-EST. PATIENT-LVL III: CPT | Mod: PBBFAC,,, | Performed by: STUDENT IN AN ORGANIZED HEALTH CARE EDUCATION/TRAINING PROGRAM

## 2024-03-14 PROCEDURE — 99215 OFFICE O/P EST HI 40 MIN: CPT | Mod: S$GLB,,, | Performed by: STUDENT IN AN ORGANIZED HEALTH CARE EDUCATION/TRAINING PROGRAM

## 2024-03-14 PROCEDURE — G2211 COMPLEX E/M VISIT ADD ON: HCPCS | Mod: S$GLB,,, | Performed by: STUDENT IN AN ORGANIZED HEALTH CARE EDUCATION/TRAINING PROGRAM

## 2024-03-14 NOTE — LETTER
March 14, 2024      [facility] - 10th Fl  [address]  [facility] [address]  Phone: [phone]  Fax: [phone]       Patient: Malinda Brady   YOB: 2000  Date of Visit: 03/14/2024    To Whom It May Concern:    Adrienne Brady  was at [facility] on 03/14/2024. The patient may return to school on 3/14/2024 with no restrictions. If you have any questions or concerns, or if I can be of further assistance, please do not hesitate to contact me.    Sincerely,    Dr. Venice Pardo

## 2024-03-15 LAB
GAMMA INTERFERON BACKGROUND BLD IA-ACNC: 0.01 IU/ML
M TB IFN-G CD4+ BCKGRND COR BLD-ACNC: 0 IU/ML
M TB IFN-G CD4+ BCKGRND COR BLD-ACNC: 0.01 IU/ML
MITOGEN IGNF BCKGRD COR BLD-ACNC: 9.99 IU/ML
TB GOLD PLUS: NEGATIVE

## 2024-04-11 ENCOUNTER — PATIENT MESSAGE (OUTPATIENT)
Dept: ADMINISTRATIVE | Facility: HOSPITAL | Age: 24
End: 2024-04-11
Payer: COMMERCIAL

## 2024-06-13 ENCOUNTER — PATIENT MESSAGE (OUTPATIENT)
Dept: ADMINISTRATIVE | Facility: HOSPITAL | Age: 24
End: 2024-06-13
Payer: COMMERCIAL

## 2024-09-13 ENCOUNTER — PATIENT MESSAGE (OUTPATIENT)
Dept: ADMINISTRATIVE | Facility: HOSPITAL | Age: 24
End: 2024-09-13
Payer: COMMERCIAL

## 2025-01-14 ENCOUNTER — PATIENT MESSAGE (OUTPATIENT)
Dept: ADMINISTRATIVE | Facility: HOSPITAL | Age: 25
End: 2025-01-14
Payer: COMMERCIAL

## 2025-04-14 ENCOUNTER — PATIENT MESSAGE (OUTPATIENT)
Dept: ADMINISTRATIVE | Facility: HOSPITAL | Age: 25
End: 2025-04-14
Payer: COMMERCIAL

## 2025-05-30 ENCOUNTER — PATIENT MESSAGE (OUTPATIENT)
Dept: OBSTETRICS AND GYNECOLOGY | Facility: CLINIC | Age: 25
End: 2025-05-30

## 2025-05-30 ENCOUNTER — CLINICAL SUPPORT (OUTPATIENT)
Dept: OBSTETRICS AND GYNECOLOGY | Facility: CLINIC | Age: 25
End: 2025-05-30

## 2025-05-30 DIAGNOSIS — N91.2 ABSENT MENSES: Primary | ICD-10-CM

## 2025-05-30 PROCEDURE — 99999 PR PBB SHADOW E&M-EST. PATIENT-LVL I: CPT | Mod: PBBFAC,,,

## 2025-05-30 PROCEDURE — 99211 OFF/OP EST MAY X REQ PHY/QHP: CPT | Mod: PBBFAC,PN

## 2025-05-30 NOTE — PROGRESS NOTES
Spoke with patient for a total of 30 minutes during virtual visit.  Updated chart to reflect up to date patient demographics.  Allergies, medications, pharmacy, medical/family history and OB history updated.  Patient was guided through expectations of care during pregnancy.  Pregnancy confirmation scheduled for 06/24/25 at 1:20pm with Dr. Hong.  Education provided & questions answered. Encouraged to send message or call office with any questions/concerns. Verbalized understanding.     Discussed with pt:    Lmp 04/13/25.  Encouraged to begin taking a PNV.   C/o n/v.  C/o cramping, no spotting reported.  Precautions discussed.  Referred to [email or web address] for Preg A to Z guide & class schedule.   Discussed benefits of breastfeeding.  Discussed need for pediatrician. Mentioned [facility] and multiple convenient locations.

## 2025-06-24 ENCOUNTER — OFFICE VISIT (OUTPATIENT)
Dept: OBSTETRICS AND GYNECOLOGY | Facility: CLINIC | Age: 25
End: 2025-06-24
Payer: COMMERCIAL

## 2025-06-24 VITALS — SYSTOLIC BLOOD PRESSURE: 132 MMHG | BODY MASS INDEX: 46.99 KG/M2 | WEIGHT: 293 LBS | DIASTOLIC BLOOD PRESSURE: 80 MMHG

## 2025-06-24 DIAGNOSIS — E66.9 OBESITY, UNSPECIFIED CLASS, UNSPECIFIED OBESITY TYPE, UNSPECIFIED WHETHER SERIOUS COMORBIDITY PRESENT: ICD-10-CM

## 2025-06-24 DIAGNOSIS — G93.2 IIH (IDIOPATHIC INTRACRANIAL HYPERTENSION): ICD-10-CM

## 2025-06-24 DIAGNOSIS — N91.0 DELAYED MENSES: Primary | ICD-10-CM

## 2025-06-24 DIAGNOSIS — G08 CHRONIC CEREBRAL VENOUS SINUS THROMBOSIS: ICD-10-CM

## 2025-06-24 LAB
B-HCG UR QL: POSITIVE
CTP QC/QA: YES

## 2025-06-24 PROCEDURE — 99999 PR PBB SHADOW E&M-EST. PATIENT-LVL III: CPT | Mod: PBBFAC,,, | Performed by: STUDENT IN AN ORGANIZED HEALTH CARE EDUCATION/TRAINING PROGRAM

## 2025-06-24 RX ORDER — PSYLLIUM HUSK 0.4 G
600 CAPSULE ORAL ONCE
COMMUNITY

## 2025-06-24 RX ORDER — MAGNESIUM GLUCONATE 27.5 (500)
TABLET ORAL ONCE
COMMUNITY

## 2025-06-24 RX ORDER — ASPIRIN 81 MG/1
81 TABLET ORAL DAILY
Qty: 150 TABLET | Refills: 2 | Status: SHIPPED | OUTPATIENT
Start: 2025-06-24 | End: 2026-06-24

## 2025-06-24 NOTE — PROGRESS NOTES
CC: Absence of menses    Malinda Brady is a 25 y.o. female  presents with complaint of absence of menstruation.  She denies nausea/vomIting/abdominal pain/bleeding.  UPT is positive.     LMP 25 [id number] ED    OB hx:  G1    PMH: dural venous sinous stenosis with stent in place , idiopathic incranial HTN   PSH:   Social: no tobacco use, stopped 10 weeks ago   Denies any family hx of genetic disorders, chromosomal abnormalities, Neural tube defects, or congenital heart defects.      Past Medical History:   Diagnosis Date    Acne     Menarche     Age of onset 10     Past Surgical History:   Procedure Laterality Date    PLACEMENT-STENT N/A 10/2023    Brain Stent    TONSILLECTOMY      Tonsils       Social History     Socioeconomic History    Marital status: Single   Occupational History    Occupation: Student   Tobacco Use    Smoking status: Never    Smokeless tobacco: Never   Substance and Sexual Activity    Alcohol use: Not Currently    Drug use: Never    Sexual activity: Yes     Partners: Male     Birth control/protection: None   Other Topics Concern    Are you pregnant or think you may be? No    Breast-feeding No   Social History Narrative    Lives in a dorm room at school and comes home to both parents during her breaks    2 dog     3 siblings    1st year at [employer]     Social Drivers of Health     Financial Resource Strain: Low Risk  (10/10/2023)    Overall Financial Resource Strain (CARDIA)     Difficulty of Paying Living Expenses: Not hard at all   Food Insecurity: No Food Insecurity (10/10/2023)    Hunger Vital Sign     Worried About Running Out of Food in the Last Year: Never true     Ran Out of Food in the Last Year: Never true   Transportation Needs: No Transportation Needs (10/10/2023)    PRAPARE - Transportation     Lack of Transportation (Medical): No     Lack of Transportation (Non-Medical): No   Physical Activity: Inactive (10/10/2023)    Exercise Vital Sign     Days of  Exercise per Week: 0 days     Minutes of Exercise per Session: 0 min   Stress: No Stress Concern Present (10/10/2023)    [employer] [place] [employer] - Occupational Stress Questionnaire     Feeling of Stress : Not at all   Housing Stability: Low Risk  (10/10/2023)    Housing Stability Vital Sign     Unable to Pay for Housing in the Last Year: No     Number of Places Lived in the Last Year: 2     Unstable Housing in the Last Year: No     Family History   Problem Relation Name Age of Onset    Breast cancer Paternal Grandmother      Migraines Father      Breast cancer Mother      Miscarriages / Stillbirths Mother      Arthritis Mother      Acne Brother      Miscarriages / Stillbirths Sister      Migraines Sister      No Known Problems Sister      Colon cancer Neg Hx      Diabetes Neg Hx      Hypertension Neg Hx      Ovarian cancer Neg Hx      Melanoma Neg Hx      Lupus Neg Hx      Psoriasis Neg Hx      Eczema Neg Hx      Cancer Neg Hx      Cervical cancer Neg Hx      Uterine cancer Neg Hx       OB History    Para Term  AB Living   1 0 0 0 0 0   SAB IAB Ectopic Multiple Live Births   0 0 0 0       # Outcome Date GA Lbr Anshu/2nd Weight Sex Type Anes PTL Lv   1                 /80 (Patient Position: Sitting)   Wt (!) 136.1 kg (300 lb 0.7 oz)   LMP 2025 (Exact Date)   Breastfeeding Unknown   BMI 46.99 kg/m²     ROS:  GENERAL: Denies weight gain or weight loss. Feeling well overall.   SKIN: Denies rash or lesions.   HEAD: Denies head injury or headache.   NODES: Denies enlarged lymph nodes.   CHEST: Denies chest pain or shortness of breath.   CARDIOVASCULAR: Denies palpitations or left sided chest pain.   ABDOMEN: No abdominal pain, constipation, diarrhea, nausea, vomiting or rectal bleeding.   URINARY: No frequency, dysuria, hematuria, or burning on urination.  REPRODUCTIVE: See HPI.   HEMATOLOGIC: No easy bruisability or excessive bleeding.   MUSCULOSKELETAL: Denies joint  pain or swelling.   NEUROLOGIC: Denies syncope or weakness.   PSYCHIATRIC: Denies depression, anxiety or mood swings.    PE:   APPEARANCE: Well nourished, well developed, in no acute distress.  AFFECT: WNL, alert and oriented x 3.  SKIN: No acne or hirsutism.  NECK: Neck symmetric without masses or thyromegaly.  NODES: No inguinal, cervical, axillary or femoral lymph node enlargement.  CHEST: Good respiratory effort.   ABDOMEN: Soft. No tenderness or masses. No hepatosplenomegaly. No hernias.        ULTRASOUND:   Ultrasound performed in the usual fashion, showing viable mcclendon intrauterine pregnancy, crown-rump length = 30   mm with flicker,   consistent with   10w0d    No free fluid in cul-de-sac or adnexal pathology.    ASSESSMENT and PLAN:    ICD-10-CM ICD-9-CM    1. Delayed menses  N91.0 626.8 POCT urine pregnancy      Hepatitis C Antibody      HIV 1/2 Ag/Ab (4th Gen)      Treponema Pallidium Antibodies IgG, IgM      Hepatitis B surface antigen      Type & Screen      Rubella antibody, IgG      Urine culture      CBC auto differential      Varicella zoster antibody, IgG      C. trachomatis/N. gonorrhoeae by AMP DNA      Protein/Creatinine Ratio, Urine      Comprehensive Metabolic Panel      Ambulatory referral/consult to Perinatology      2. Obesity, unspecified class, unspecified obesity type, unspecified whether serious comorbidity present  E66.9 278.00 Ambulatory referral/consult to Perinatology      3. Dural Venous Sinus Stenosis  G08 325 Ambulatory referral/consult to Perinatology      4. IIH (idiopathic intracranial hypertension)  G93.2 348.2 Ambulatory referral/consult to Perinatology          Orders Placed This Encounter   Procedures    Urine culture           Send normal result to authorizing provider's In Basket if patient is active on MyChart::   Yes    Hepatitis C Antibody     Standing Status:   Future     Expected Date:   6/24/2025     Expiration Date:   9/22/2026     Release to patient:    Immediate     Send normal result to authorizing provider's In Basket if patient is active on MyChart::   Yes    HIV 1/2 Ag/Ab (4th Gen)     Standing Status:   Future     Expected Date:   6/24/2025     Expiration Date:   9/22/2026     Release to patient:   Immediate     HIV Attestation: Patient notified of HIV testing and provided with explanation of positive and negative test results. Patient was given opportunity to ask questions.:   Yes     Send normal result to authorizing provider's In Basket if patient is active on MyChart::   Yes    Treponema Pallidium Antibodies IgG, IgM     Standing Status:   Future     Expected Date:   6/24/2025     Expiration Date:   9/22/2026     Send normal result to authorizing provider's In Basket if patient is active on MyChart::   Yes    Hepatitis B surface antigen     Standing Status:   Future     Expected Date:   6/24/2025     Expiration Date:   9/22/2026     Send normal result to authorizing provider's In Basket if patient is active on MyChart::   Yes    Rubella antibody, IgG     Standing Status:   Future     Expected Date:   6/24/2025     Expiration Date:   9/22/2026     Send normal result to authorizing provider's In Basket if patient is active on MyChart::   Yes    CBC auto differential     Standing Status:   Future     Expected Date:   6/24/2025     Expiration Date:   9/22/2026     Send normal result to authorizing provider's In Basket if patient is active on MyChart::   Yes    Varicella zoster antibody, IgG     Standing Status:   Future     Expected Date:   6/24/2025     Expiration Date:   9/22/2026     Send normal result to authorizing provider's In Basket if patient is active on MyChart::   Yes    C. trachomatis/N. gonorrhoeae by AMP DNA     Source::   Urine    Protein/Creatinine Ratio, Urine     Specimen Source:   Urine    Comprehensive Metabolic Panel     Standing Status:   Future     Expected Date:   6/24/2025     Expiration Date:   8/23/2026     Send normal result to  authorizing provider's In Basket if patient is active on MyChart::   Yes    Ambulatory referral/consult to Perinatology     Standing Status:   Future     Expected Date:   7/1/2025     Expiration Date:   7/24/2026     Referral Priority:   Routine     Referral Type:   Consultation     Referral Reason:   Specialty Services Required     Requested Specialty:   Maternal and Fetal Medicine     Number of Visits Requested:   1    POCT urine pregnancy    Type & Screen     Standing Status:   Future     Expected Date:   6/24/2025     Expiration Date:   9/22/2026         - keep NIKKI 1/18/26  - Patient was counseled today on proper weight gain based on the [place] [facility]'s recommendations based on her pre-pregnancy weight. Discussed foods to avoid in pregnancy (i.e. sushi, fish that are high in mercury, deli meat, and unpasteurized cheeses). Discussed prenatal vitamin options (i.e. stool softener, DHA). Discussed genetic screening, patient unsure, discussed genetic carrier screening, patient unsure  - 1T labs ordered  - pap declines, defers to pp  - US performed today in clinic   - will think about NIPT and consider next visit  - ASA @ 12 weeks  - MFM referral for obesity, level 2 US, and hx of dural venous sinus stenosis with stent in place, NOT on anticoagulation.     No follow-ups on file.

## 2025-06-26 ENCOUNTER — TELEPHONE (OUTPATIENT)
Dept: MATERNAL FETAL MEDICINE | Facility: CLINIC | Age: 25
End: 2025-06-26
Payer: COMMERCIAL

## 2025-08-06 ENCOUNTER — LAB VISIT (OUTPATIENT)
Dept: LAB | Facility: HOSPITAL | Age: 25
End: 2025-08-06
Attending: STUDENT IN AN ORGANIZED HEALTH CARE EDUCATION/TRAINING PROGRAM
Payer: COMMERCIAL

## 2025-08-06 ENCOUNTER — ROUTINE PRENATAL (OUTPATIENT)
Dept: OBSTETRICS AND GYNECOLOGY | Facility: CLINIC | Age: 25
End: 2025-08-06
Payer: COMMERCIAL

## 2025-08-06 VITALS — BODY MASS INDEX: 47.13 KG/M2 | WEIGHT: 293 LBS | DIASTOLIC BLOOD PRESSURE: 80 MMHG | SYSTOLIC BLOOD PRESSURE: 138 MMHG

## 2025-08-06 DIAGNOSIS — Z3A.16 16 WEEKS GESTATION OF PREGNANCY: Primary | ICD-10-CM

## 2025-08-06 DIAGNOSIS — Z3A.16 16 WEEKS GESTATION OF PREGNANCY: ICD-10-CM

## 2025-08-06 PROCEDURE — 99999 PR PBB SHADOW E&M-EST. PATIENT-LVL III: CPT | Mod: PBBFAC,,, | Performed by: STUDENT IN AN ORGANIZED HEALTH CARE EDUCATION/TRAINING PROGRAM

## 2025-08-06 PROCEDURE — 0500F INITIAL PRENATAL CARE VISIT: CPT | Mod: S$GLB,,, | Performed by: STUDENT IN AN ORGANIZED HEALTH CARE EDUCATION/TRAINING PROGRAM

## 2025-08-06 PROCEDURE — 36415 COLL VENOUS BLD VENIPUNCTURE: CPT | Mod: PN

## 2025-08-06 NOTE — PROGRESS NOTES
Routine OB complaints today:None, No Bleeding or pains    /80   Wt (!) 136.5 kg (300 lb 14.9 oz)   LMP 2025 (Exact Date)   BMI 47.13 kg/m²     25 y.o., at 16w3d by Estimated Date of Delivery: 26  Problem List[1]  OB History    Para Term  AB Living   2 0 0 0 0 0   SAB IAB Ectopic Multiple Live Births   0 0 0 0       # Outcome Date GA Lbr Anshu/2nd Weight Sex Type Anes PTL Lv   2 Current            1                 Dating reviewed    Allergies and problem list reviewed and updated    Medical and surgical history reviewed    Prenatal labs reviewed and updated    Physical Exam:  ABD: soft, gravid, nontender,     Assessment:  Malinda was seen today for routine prenatal visit.    Diagnoses and all orders for this visit:    16 weeks gestation of pregnancy  -     POCT Urinalysis(Instrument)  -     Connected MOM Enrollment  -     Assign Connected MOM Program Consent Questionnaire  -     Miscellaneous Test, Sendout [place]; Future          Plan:   - NIPT and 1 T labs  - MFM US coming up  - connected MOM   follow up 4Weeks, bleeding/pain precautions  kick counts, labor precautions given    Jessica Hong M.D.  Obstetrics and Gynecology             [1]   Patient Active Problem List  Diagnosis    Acne    Morbid obesity with BMI of 40.0-44.9, adult    Migraine without aura and without status migrainosus, not intractable    Visual disturbance    Elevated blood pressure reading in office without diagnosis of hypertension    IIH (idiopathic intracranial hypertension)    Dural Venous Sinus Stenosis

## 2025-08-08 ENCOUNTER — PATIENT MESSAGE (OUTPATIENT)
Dept: OBSTETRICS AND GYNECOLOGY | Facility: CLINIC | Age: 25
End: 2025-08-08
Payer: COMMERCIAL

## 2025-08-10 ENCOUNTER — PATIENT MESSAGE (OUTPATIENT)
Dept: OTHER | Facility: OTHER | Age: 25
End: 2025-08-10
Payer: COMMERCIAL

## 2025-08-21 DIAGNOSIS — G08 CHRONIC CEREBRAL VENOUS SINUS THROMBOSIS: Primary | ICD-10-CM

## 2025-08-21 DIAGNOSIS — G93.2 IIH (IDIOPATHIC INTRACRANIAL HYPERTENSION): ICD-10-CM

## 2025-08-25 ENCOUNTER — PROCEDURE VISIT (OUTPATIENT)
Dept: MATERNAL FETAL MEDICINE | Facility: CLINIC | Age: 25
End: 2025-08-25
Payer: COMMERCIAL

## 2025-08-25 ENCOUNTER — OFFICE VISIT (OUTPATIENT)
Dept: MATERNAL FETAL MEDICINE | Facility: CLINIC | Age: 25
End: 2025-08-25
Payer: COMMERCIAL

## 2025-08-25 VITALS
SYSTOLIC BLOOD PRESSURE: 148 MMHG | BODY MASS INDEX: 45.99 KG/M2 | HEIGHT: 67 IN | WEIGHT: 293 LBS | OXYGEN SATURATION: 99 % | HEART RATE: 89 BPM | DIASTOLIC BLOOD PRESSURE: 95 MMHG

## 2025-08-25 DIAGNOSIS — G08 CHRONIC CEREBRAL VENOUS SINUS THROMBOSIS: ICD-10-CM

## 2025-08-25 DIAGNOSIS — G93.2 IIH (IDIOPATHIC INTRACRANIAL HYPERTENSION): ICD-10-CM

## 2025-08-25 DIAGNOSIS — Z36.2 ENCOUNTER FOR FOLLOW-UP ULTRASOUND OF FETAL ANATOMY: Primary | ICD-10-CM

## 2025-08-25 DIAGNOSIS — G93.2 IIH (IDIOPATHIC INTRACRANIAL HYPERTENSION): Primary | ICD-10-CM

## 2025-08-25 DIAGNOSIS — G43.009 MIGRAINE WITHOUT AURA AND WITHOUT STATUS MIGRAINOSUS, NOT INTRACTABLE: Chronic | ICD-10-CM

## 2025-08-25 PROCEDURE — 99214 OFFICE O/P EST MOD 30 MIN: CPT | Mod: S$GLB,,, | Performed by: STUDENT IN AN ORGANIZED HEALTH CARE EDUCATION/TRAINING PROGRAM

## 2025-08-25 PROCEDURE — 99999 PR PBB SHADOW E&M-EST. PATIENT-LVL III: CPT | Mod: PBBFAC,,, | Performed by: STUDENT IN AN ORGANIZED HEALTH CARE EDUCATION/TRAINING PROGRAM

## 2025-08-25 PROCEDURE — 76811 OB US DETAILED SNGL FETUS: CPT | Mod: S$GLB,,, | Performed by: STUDENT IN AN ORGANIZED HEALTH CARE EDUCATION/TRAINING PROGRAM

## 2025-08-28 ENCOUNTER — TELEPHONE (OUTPATIENT)
Dept: OBSTETRICS AND GYNECOLOGY | Facility: CLINIC | Age: 25
End: 2025-08-28
Payer: COMMERCIAL

## 2025-08-28 ENCOUNTER — OCHSNER VIRTUAL EMERGENCY DEPARTMENT (OUTPATIENT)
Facility: CLINIC | Age: 25
End: 2025-08-28
Payer: COMMERCIAL

## 2025-08-28 ENCOUNTER — PATIENT OUTREACH (OUTPATIENT)
Facility: OTHER | Age: 25
End: 2025-08-28
Payer: COMMERCIAL

## 2025-08-28 ENCOUNTER — NURSE TRIAGE (OUTPATIENT)
Dept: ADMINISTRATIVE | Facility: CLINIC | Age: 25
End: 2025-08-28
Payer: COMMERCIAL

## 2025-08-29 ENCOUNTER — CLINICAL SUPPORT (OUTPATIENT)
Dept: OBSTETRICS AND GYNECOLOGY | Facility: CLINIC | Age: 25
End: 2025-08-29
Payer: COMMERCIAL

## 2025-08-29 VITALS — DIASTOLIC BLOOD PRESSURE: 98 MMHG | SYSTOLIC BLOOD PRESSURE: 150 MMHG

## 2025-08-29 DIAGNOSIS — Z01.30 BLOOD PRESSURE CHECK: Primary | ICD-10-CM

## 2025-08-29 DIAGNOSIS — O10.919 CHRONIC HYPERTENSION AFFECTING PREGNANCY: ICD-10-CM

## 2025-08-29 PROCEDURE — 99999 PR PBB SHADOW E&M-EST. PATIENT-LVL I: CPT | Mod: PBBFAC,,,

## 2025-08-29 RX ORDER — NIFEDIPINE 30 MG/1
30 TABLET, EXTENDED RELEASE ORAL DAILY
Qty: 30 TABLET | Refills: 11 | Status: SHIPPED | OUTPATIENT
Start: 2025-08-29 | End: 2026-08-29

## 2025-08-30 ENCOUNTER — PATIENT OUTREACH (OUTPATIENT)
Facility: OTHER | Age: 25
End: 2025-08-30
Payer: COMMERCIAL

## 2025-08-31 ENCOUNTER — PATIENT OUTREACH (OUTPATIENT)
Facility: OTHER | Age: 25
End: 2025-08-31
Payer: COMMERCIAL

## 2025-09-04 DIAGNOSIS — Z3A.20 20 WEEKS GESTATION OF PREGNANCY: Primary | ICD-10-CM

## 2025-09-04 DIAGNOSIS — O10.919 CHRONIC HYPERTENSION AFFECTING PREGNANCY: ICD-10-CM
